# Patient Record
Sex: MALE | NOT HISPANIC OR LATINO | Employment: UNEMPLOYED | URBAN - NONMETROPOLITAN AREA
[De-identification: names, ages, dates, MRNs, and addresses within clinical notes are randomized per-mention and may not be internally consistent; named-entity substitution may affect disease eponyms.]

---

## 2021-03-05 RX ORDER — ERGOCALCIFEROL 1.25 MG/1
50000 CAPSULE ORAL WEEKLY
COMMUNITY

## 2021-03-05 RX ORDER — INSULIN GLARGINE 100 [IU]/ML
INJECTION, SOLUTION SUBCUTANEOUS
COMMUNITY
End: 2021-04-23 | Stop reason: HOSPADM

## 2021-03-05 RX ORDER — FLUTICASONE PROPIONATE 50 MCG
2 SPRAY, SUSPENSION (ML) NASAL DAILY
COMMUNITY
End: 2021-03-16

## 2021-03-05 RX ORDER — PRAVASTATIN SODIUM 10 MG
10 TABLET ORAL DAILY
COMMUNITY
End: 2021-03-16

## 2021-03-16 ENCOUNTER — OFFICE VISIT (OUTPATIENT)
Dept: GASTROENTEROLOGY | Facility: CLINIC | Age: 56
End: 2021-03-16

## 2021-03-16 VITALS
SYSTOLIC BLOOD PRESSURE: 160 MMHG | DIASTOLIC BLOOD PRESSURE: 80 MMHG | HEIGHT: 64 IN | OXYGEN SATURATION: 98 % | BODY MASS INDEX: 34.45 KG/M2 | TEMPERATURE: 98.4 F | HEART RATE: 69 BPM | WEIGHT: 201.8 LBS

## 2021-03-16 DIAGNOSIS — R19.4 CHANGE IN BOWEL HABITS: Primary | ICD-10-CM

## 2021-03-16 DIAGNOSIS — Z12.11 ENCOUNTER FOR SCREENING FOR MALIGNANT NEOPLASM OF COLON: ICD-10-CM

## 2021-03-16 DIAGNOSIS — Z80.0 FAMILY HX OF COLON CANCER: ICD-10-CM

## 2021-03-16 DIAGNOSIS — K52.9 CHRONIC DIARRHEA: ICD-10-CM

## 2021-03-16 DIAGNOSIS — R68.81 EARLY SATIETY: ICD-10-CM

## 2021-03-16 DIAGNOSIS — Z01.812 PRE-PROCEDURE LAB EXAM: Primary | ICD-10-CM

## 2021-03-16 PROCEDURE — 99204 OFFICE O/P NEW MOD 45 MIN: CPT | Performed by: INTERNAL MEDICINE

## 2021-03-16 RX ORDER — SODIUM, POTASSIUM,MAG SULFATES 17.5-3.13G
2 SOLUTION, RECONSTITUTED, ORAL ORAL ONCE
Qty: 354 ML | Refills: 0 | Status: SHIPPED | OUTPATIENT
Start: 2021-03-16 | End: 2021-03-16

## 2021-03-16 RX ORDER — NAPROXEN SODIUM 220 MG
TABLET ORAL AS NEEDED
COMMUNITY

## 2021-03-16 RX ORDER — SODIUM CHLORIDE 9 MG/ML
70 INJECTION, SOLUTION INTRAVENOUS CONTINUOUS PRN
Status: CANCELLED | OUTPATIENT
Start: 2021-03-16

## 2021-03-16 NOTE — PROGRESS NOTES
New Patient Consult      Date: 2021   Patient Name: Robinson Tovar  MRN: [id number]  : 1965     Referring Physician: Eleonora Garcia APRN    Chief Complaint   Patient presents with   • Early Satiety       History of Present Illness: Robinson Tovar is a 55 y.o. male who is here today to establish care with Gastroenterology for evaluation for change in bowel habit and early satiety, diarrhea.   He has a chronic episodic diarrhea for about 15-20 yrs. He normally gets one soft bowel movement but intermittently gets episodes of diarhea. He gets these episodes once in a week or so, last for 3-4 days and clears off. Associated abdominal rumbling. He feels like full all day without any hunger. He will get lot of gas and benching.     This patient deny any abdominal pain, no recent change in bowel habit, hematochezia or melena.  Weight is stable. Pt denies nausea, vomiting or odynophagia or dysphagia. There is  history of occasional acid reflux. There is no history of anemia. No prior recent history of EGD or colonoscopy. Family history of colon cancer- Dad at the age of 70. No history of any abdominal surgery. Denies alcohol abuse or cigarette smoking. He quit chewing tobacco.     He has a diabetes mellitus with noncompliance with medication.  He is currently on insulin.     Subjective      Past Medical History:   Past Medical History:   Diagnosis Date   • Diabetes mellitus (CMS/HCC)    • Early satiety    • Gastroparesis    • Hyperlipidemia    • Hypertension        Past Surgical History:   Past Surgical History:   Procedure Laterality Date   • EYE SURGERY  2017   • KIDNEY STONE SURGERY         Family History:   Family History   Problem Relation Age of Onset   • Diabetes Father        Social History:   Social History     Socioeconomic History   • Marital status: Unknown     Spouse name: Not on file   • Number of children: Not on file   • Years of education: Not on file   • Highest education level: Not  on file         Current Outpatient Medications:   •  ergocalciferol (Drisdol) 1.25 MG (31768 UT) capsule, Take 50,000 Units by mouth 1 (One) Time Per Week., Disp: , Rfl:   •  insulin aspart (novoLOG FLEXPEN) 100 UNIT/ML solution pen-injector sc pen, Inject  under the skin into the appropriate area as directed 3 (Three) Times a Day With Meals., Disp: , Rfl:   •  Insulin Glargine (BASAGLAR KWIKPEN) 100 UNIT/ML injection pen, Inject  under the skin into the appropriate area as directed., Disp: , Rfl:   •  Pseudoephedrine-Naproxen Na ER (Aleve-D Sinus & Cold) 120-220 MG tablet sustained-release 12 hour, Take  by mouth., Disp: , Rfl:   •  sodium-potassium-magnesium sulfates (Suprep Bowel Prep Kit) 17.5-3.13-1.6 GM/177ML solution oral solution, Take 2 bottles by mouth 1 (One) Time for 1 dose. Please see prep instructions from office., Disp: 354 mL, Rfl: 0    Allergies   Allergen Reactions   • Apidra [Insulin Glulisine] Nausea And Vomiting   • Basaglar Kwikpen [Insulin Glargine] Nausea And Vomiting     Nausea, vomiting, diarrhea   • Humalog [Insulin Lispro] GI Intolerance   • Levemir [Insulin Detemir] Other (See Comments)     Abdominal pain   • Novolog [Insulin Aspart] Nausea And Vomiting     Nausea, vomiting, stomach cramps   • Tresiba [Insulin Degludec] Diarrhea     Diarrhea and stomach cramps   • Xultophy [Insulin Degludec-Liraglutide] Diarrhea     Diarrhea and stomach cramps       Review of Systems:   Review of Systems   Constitutional: Negative for appetite change, fatigue, fever and unexpected weight loss.   HENT: Negative for trouble swallowing.    Respiratory: Negative for cough, shortness of breath and wheezing.    Cardiovascular: Negative for chest pain, palpitations and leg swelling.   Gastrointestinal: Positive for diarrhea and GERD. Negative for abdominal distention, abdominal pain, anal bleeding, blood in stool, constipation, nausea, rectal pain, vomiting and indigestion.   Genitourinary: Negative for  "dysuria, frequency and hematuria.   Musculoskeletal: Negative for back pain and joint swelling.   Skin: Negative for color change, rash and skin lesions.   Neurological: Negative for dizziness, syncope, speech difficulty, weakness, headache and memory problem.   Hematological: Negative for adenopathy. Does not bruise/bleed easily.   Psychiatric/Behavioral: Negative for agitation, behavioral problems, suicidal ideas and depressed mood.       The following portions of the patient's history were reviewed and updated as appropriate: allergies, current medications, past family history, past medical history, past social history, past surgical history and problem list.    Objective     Physical Exam:  Vital Signs:   Vitals:    03/16/21 1542   BP: 160/80   Pulse: 69   Temp: 98.4 °F (36.9 °C)   TempSrc: Temporal   SpO2: 98%   Weight: 91.5 kg (201 lb 12.8 oz)   Height: 162.6 cm (64\")       Physical Exam  Vitals and nursing note reviewed.   Constitutional:       Appearance: He is well-developed. He is obese.   HENT:      Head: Normocephalic and atraumatic.      Right Ear: External ear normal.      Left Ear: External ear normal.   Eyes:      Conjunctiva/sclera: Conjunctivae normal.   Neck:      Thyroid: No thyromegaly.      Trachea: No tracheal deviation.   Cardiovascular:      Rate and Rhythm: Normal rate and regular rhythm.      Heart sounds: No murmur.   Pulmonary:      Effort: Pulmonary effort is normal. No respiratory distress.      Breath sounds: Normal breath sounds.   Abdominal:      General: Bowel sounds are normal. There is no distension.      Palpations: Abdomen is soft. There is no mass.      Tenderness: There is no abdominal tenderness.      Hernia: No hernia is present.   Musculoskeletal:         General: Normal range of motion.      Cervical back: Normal range of motion and neck supple.   Skin:     General: Skin is warm and dry.   Neurological:      General: No focal deficit present.      Mental Status: He is " alert and oriented to person, place, and time.      Cranial Nerves: No cranial nerve deficit.      Sensory: No sensory deficit.   Psychiatric:         Mood and Affect: Mood normal.         Behavior: Behavior normal.         Thought Content: Thought content normal.         Judgment: Judgment normal.         Results Review:   I have reviewed the patient's new clinical and imaging results and agree with the interpretation.     No results found for any previous visit.      No radiology results for the last 90 days.    Assessment / Plan      Assessment & Plan:  1. Change in bowel habits  2. Chronic diarrhea  He has a chronic episodic intermittent diarrhea with abdominal bloating.  This is most likely associated with autonomic neuropathy associated with the diabetes mellitus and likely irritable bowel.  He has the symptoms for over 10 to 15 years since he was diagnosed with diabetes.   No alarming symptoms.  Currently he is having a soft daily bowel movement.   Advised half a pill of Imodium once daily or twice daily during diarrheal episode if bowel movements more than 3/day    - Case Request; Standing  - Implement Anesthesia Orders Day of Procedure; Standing  - Obtain Informed Consent; Standing  - Verify Bowel Prep Was Successful; Standing  - Oxygen Therapy- Nasal Cannula; 2 LPM; Titrate for SPO2: equal to or greater than, 90%; Standing  - POC Glucose Once; Standing  - sodium chloride 0.9 % infusion  - Case Request    3. Early satiety  Patient's history is more suggestive of some degree of gastroparesis associated with her diabetes  He denies any significant nausea or vomiting at this time  Low-fat low fiber small meals advised  We will schedule him for an EGD for further evaluation      4. Family hx of colon cancer  5. Encounter for screening for malignant neoplasm of colon  His father had a colon cancer at age of 70s.  He is due for screening colonoscopy now will schedule the same    The indications, technique,  alternatives and potential risk and complications were discussed with the patient including but not limited to bleeding, bowel perforations, missing lesions and anesthetic complications. The patient understands and wishes to proceed with the procedure and has given their verbal consent. Written patient education information was given to the patient.   The patient will call if they have further questions before procedure.     - Case Request; Standing  - Implement Anesthesia Orders Day of Procedure; Standing  - Obtain Informed Consent; Standing  - Verify Bowel Prep Was Successful; Standing  - Oxygen Therapy- Nasal Cannula; 2 LPM; Titrate for SPO2: equal to or greater than, 90%; Standing  - POC Glucose Once; Standing  - sodium chloride 0.9 % infusion  - Case Request        Follow Up:   Return for Follow Up after procedure.    Cheko Rogel MD  [facility] [place]  3/16/2021  16:57 EDT    Please note that portions of this note may have been completed with a voice recognition program.

## 2021-03-18 PROBLEM — R68.81 EARLY SATIETY: Status: ACTIVE | Noted: 2021-03-18

## 2021-03-18 PROBLEM — R19.4 CHANGE IN BOWEL HABITS: Status: ACTIVE | Noted: 2021-03-18

## 2021-03-18 PROBLEM — K52.9 CHRONIC DIARRHEA: Status: ACTIVE | Noted: 2021-03-18

## 2021-03-18 PROBLEM — Z12.11 ENCOUNTER FOR SCREENING FOR MALIGNANT NEOPLASM OF COLON: Status: ACTIVE | Noted: 2021-03-18

## 2021-04-20 ENCOUNTER — LAB (OUTPATIENT)
Dept: LAB | Facility: HOSPITAL | Age: 56
End: 2021-04-20

## 2021-04-20 PROCEDURE — U0004 COV-19 TEST NON-CDC HGH THRU: HCPCS | Performed by: PHYSICIAN ASSISTANT

## 2021-04-23 ENCOUNTER — ANESTHESIA EVENT (OUTPATIENT)
Dept: GASTROENTEROLOGY | Facility: HOSPITAL | Age: 56
End: 2021-04-23

## 2021-04-23 ENCOUNTER — HOSPITAL ENCOUNTER (OUTPATIENT)
Facility: HOSPITAL | Age: 56
Setting detail: HOSPITAL OUTPATIENT SURGERY
Discharge: HOME | End: 2021-04-23
Attending: INTERNAL MEDICINE | Admitting: INTERNAL MEDICINE

## 2021-04-23 ENCOUNTER — ANESTHESIA (OUTPATIENT)
Dept: GASTROENTEROLOGY | Facility: HOSPITAL | Age: 56
End: 2021-04-23

## 2021-04-23 VITALS
DIASTOLIC BLOOD PRESSURE: 86 MMHG | WEIGHT: 201 LBS | HEART RATE: 72 BPM | SYSTOLIC BLOOD PRESSURE: 142 MMHG | BODY MASS INDEX: 34.31 KG/M2 | TEMPERATURE: 97.8 F | HEIGHT: 64 IN | RESPIRATION RATE: 16 BRPM | OXYGEN SATURATION: 99 %

## 2021-04-23 DIAGNOSIS — R68.81 EARLY SATIETY: ICD-10-CM

## 2021-04-23 DIAGNOSIS — Z12.11 ENCOUNTER FOR SCREENING FOR MALIGNANT NEOPLASM OF COLON: ICD-10-CM

## 2021-04-23 DIAGNOSIS — K52.9 CHRONIC DIARRHEA: ICD-10-CM

## 2021-04-23 DIAGNOSIS — R19.4 CHANGE IN BOWEL HABITS: ICD-10-CM

## 2021-04-23 LAB — GLUCOSE BLDC GLUCOMTR-MCNC: 133 MG/DL (ref 70–130)

## 2021-04-23 PROCEDURE — 45390 COLONOSCOPY W/RESECTION: CPT | Performed by: INTERNAL MEDICINE

## 2021-04-23 PROCEDURE — C1889 IMPLANT/INSERT DEVICE, NOC: HCPCS | Performed by: INTERNAL MEDICINE

## 2021-04-23 PROCEDURE — 45380 COLONOSCOPY AND BIOPSY: CPT | Performed by: INTERNAL MEDICINE

## 2021-04-23 PROCEDURE — 25010000002 PROPOFOL 1000 MG/100ML EMULSION: Performed by: NURSE ANESTHETIST, CERTIFIED REGISTERED

## 2021-04-23 PROCEDURE — 82962 GLUCOSE BLOOD TEST: CPT

## 2021-04-23 PROCEDURE — 45385 COLONOSCOPY W/LESION REMOVAL: CPT | Performed by: INTERNAL MEDICINE

## 2021-04-23 PROCEDURE — 43239 EGD BIOPSY SINGLE/MULTIPLE: CPT | Performed by: INTERNAL MEDICINE

## 2021-04-23 DEVICE — DEV CLIP ENDO RESOLUTION360 CONTRL ROT 235CM: Type: IMPLANTABLE DEVICE | Site: RECTUM | Status: FUNCTIONAL

## 2021-04-23 DEVICE — CLIPPING DEVICE
Type: IMPLANTABLE DEVICE | Site: RECTUM | Status: FUNCTIONAL
Brand: RESOLUTION CLIP

## 2021-04-23 RX ORDER — LIDOCAINE HYDROCHLORIDE 20 MG/ML
INJECTION, SOLUTION INTRAVENOUS AS NEEDED
Status: DISCONTINUED | OUTPATIENT
Start: 2021-04-23 | End: 2021-04-23 | Stop reason: SURG

## 2021-04-23 RX ORDER — SODIUM CHLORIDE 9 MG/ML
70 INJECTION, SOLUTION INTRAVENOUS CONTINUOUS PRN
Status: DISCONTINUED | OUTPATIENT
Start: 2021-04-23 | End: 2021-04-23 | Stop reason: HOSPADM

## 2021-04-23 RX ORDER — PROPOFOL 10 MG/ML
INJECTION, EMULSION INTRAVENOUS AS NEEDED
Status: DISCONTINUED | OUTPATIENT
Start: 2021-04-23 | End: 2021-04-23 | Stop reason: SURG

## 2021-04-23 RX ORDER — SODIUM CHLORIDE 0.9 % (FLUSH) 0.9 %
10 SYRINGE (ML) INJECTION AS NEEDED
Status: DISCONTINUED | OUTPATIENT
Start: 2021-04-23 | End: 2021-04-23 | Stop reason: HOSPADM

## 2021-04-23 RX ADMIN — PROPOFOL 50 MG: 10 INJECTION, EMULSION INTRAVENOUS at 08:25

## 2021-04-23 RX ADMIN — PROPOFOL 100 MG: 10 INJECTION, EMULSION INTRAVENOUS at 08:48

## 2021-04-23 RX ADMIN — LIDOCAINE HYDROCHLORIDE 60 MG: 20 INJECTION, SOLUTION INTRAVENOUS at 08:20

## 2021-04-23 RX ADMIN — SODIUM CHLORIDE 70 ML/HR: 9 INJECTION, SOLUTION INTRAVENOUS at 07:34

## 2021-04-23 RX ADMIN — PROPOFOL 50 MG: 10 INJECTION, EMULSION INTRAVENOUS at 08:20

## 2021-04-23 NOTE — ANESTHESIA PREPROCEDURE EVALUATION
Anesthesia Evaluation     Patient summary reviewed and Nursing notes reviewed   NPO Solid Status: > 8 hours  NPO Liquid Status: > 8 hours           Airway   Mallampati: II  TM distance: >3 FB  Neck ROM: full  No difficulty expected  Dental - normal exam     Pulmonary - normal exam   Cardiovascular - normal exam    (+) hypertension well controlled less than 2 medications, valvular problems/murmurs murmur, hyperlipidemia,       Neuro/Psych  GI/Hepatic/Renal/Endo    (+)  GERD well controlled,  diabetes mellitus type 1 well controlled using insulin,     Musculoskeletal     Abdominal  - normal exam   Substance History      OB/GYN          Other                        Anesthesia Plan    ASA 2     MAC     intravenous induction     Anesthetic plan, all risks, benefits, and alternatives have been provided, discussed and informed consent has been obtained with: patient.    Plan discussed with CRNA.

## 2021-04-23 NOTE — ANESTHESIA POSTPROCEDURE EVALUATION
Patient: Robinson Tovar    Procedure Summary     Date: 04/23/21 Room / Location: [facility] 1 / [facility]    Anesthesia Start: 0819 Anesthesia Stop: 0906    Procedures:       ESOPHAGOGASTRODUODENOSCOPY WITH BIOPSIES (N/A Esophagus)      COLONOSCOPY WITH BIOPSIES (N/A Anus) Diagnosis:       Change in bowel habits      Early satiety      Encounter for screening for malignant neoplasm of colon      Chronic diarrhea      (Change in bowel habits [R19.4])      (Early satiety [R68.81])      (Encounter for screening for malignant neoplasm of colon [Z12.11])      (Chronic diarrhea [K52.9])    Surgeons: Cheko Rogel MD Provider: Alireza Finch CRNA    Anesthesia Type: MAC ASA Status: 2          Anesthesia Type: MAC    Vitals  Vitals Value Taken Time   /86 04/23/21 0942   Temp 97.8 °F (36.6 °C) 04/23/21 0912   Pulse 72 04/23/21 0942   Resp 16 04/23/21 0942   SpO2 99 % 04/23/21 0942           Post Anesthesia Care and Evaluation    Patient location during evaluation: bedside  Patient participation: complete - patient participated  Level of consciousness: awake  Pain score: 0  Pain management: adequate  Airway patency: patent  Anesthetic complications: No anesthetic complications  PONV Status: controlled  Cardiovascular status: acceptable and stable  Respiratory status: acceptable and room air  Hydration status: acceptable

## 2021-04-27 ENCOUNTER — TELEPHONE (OUTPATIENT)
Dept: GASTROENTEROLOGY | Facility: CLINIC | Age: 56
End: 2021-04-27

## 2021-04-27 LAB
LAB AP CASE REPORT: NORMAL
PATH REPORT.FINAL DX SPEC: NORMAL

## 2021-04-27 RX ORDER — OMEPRAZOLE 20 MG/1
20 CAPSULE, DELAYED RELEASE ORAL
Qty: 30 CAPSULE | Refills: 2 | Status: SHIPPED | OUTPATIENT
Start: 2021-04-27 | End: 2021-04-28 | Stop reason: SDUPTHER

## 2021-04-27 NOTE — TELEPHONE ENCOUNTER
Patient called for biopsy results.  He has a couple precancerous polyps, which are out, and possible inflammation in the stomach, results will be discussed in detail at the [facility] appt in June.

## 2021-04-28 ENCOUNTER — TELEPHONE (OUTPATIENT)
Dept: GASTROENTEROLOGY | Facility: CLINIC | Age: 56
End: 2021-04-28

## 2021-04-28 RX ORDER — OMEPRAZOLE 20 MG/1
20 CAPSULE, DELAYED RELEASE ORAL
Qty: 30 CAPSULE | Refills: 2 | Status: SHIPPED | OUTPATIENT
Start: 2021-04-28 | End: 2021-08-24

## 2021-04-28 NOTE — TELEPHONE ENCOUNTER
----- Message from Rebecca Dimas PA-C sent at 4/28/2021  2:39 PM EDT -----  Re-sent to [facility] in [facility]  ----- Message -----  From: Jenniffer Adan MA  Sent: 4/28/2021   1:48 PM EDT  To: Rebecca Dimas PA-C    Patient states pharmacy did not get his prescription.  I called the pharmacy, no one answered.  Can you resend it please?

## 2021-06-03 ENCOUNTER — OFFICE VISIT (OUTPATIENT)
Dept: GASTROENTEROLOGY | Facility: CLINIC | Age: 56
End: 2021-06-03

## 2021-06-03 VITALS
TEMPERATURE: 97.7 F | WEIGHT: 199 LBS | RESPIRATION RATE: 24 BRPM | DIASTOLIC BLOOD PRESSURE: 100 MMHG | BODY MASS INDEX: 33.97 KG/M2 | HEART RATE: 78 BPM | SYSTOLIC BLOOD PRESSURE: 185 MMHG | HEIGHT: 64 IN

## 2021-06-03 DIAGNOSIS — K21.9 GASTROESOPHAGEAL REFLUX DISEASE WITHOUT ESOPHAGITIS: ICD-10-CM

## 2021-06-03 DIAGNOSIS — D12.8 TUBULAR ADENOMA OF RECTUM: ICD-10-CM

## 2021-06-03 DIAGNOSIS — R68.81 EARLY SATIETY: Primary | ICD-10-CM

## 2021-06-03 DIAGNOSIS — K58.0 IRRITABLE BOWEL SYNDROME WITH DIARRHEA: ICD-10-CM

## 2021-06-03 PROCEDURE — 99214 OFFICE O/P EST MOD 30 MIN: CPT | Performed by: PHYSICIAN ASSISTANT

## 2021-06-03 RX ORDER — DEXLANSOPRAZOLE 60 MG/1
60 CAPSULE, DELAYED RELEASE ORAL DAILY
Qty: 15 CAPSULE | Refills: 0 | COMMUNITY
Start: 2021-06-03 | End: 2021-08-24

## 2021-06-03 NOTE — PROGRESS NOTES
Follow Up Note     Date: 2021   Patient Name: Robinson Tovar  MRN: [id number]  : 1965     Primary Care Provider: Eleonora Garcia APRN     Chief Complaint:    Chief Complaint   Patient presents with   • Follow-up   • Change in bowel habits   • Early satiety     History of present illness:   2021  Robinson Tovar is a 56 y.o. male who is here today for follow up regarding recent colonoscopy and change in bowel habits.    He would like to discuss results of his recent colonoscopy.  He is still intermittent but mild diarrhea.  He is working on changing his diet which has helped some with symptoms.  He has stopped taking Prilosec 20 mg once daily and this is helping some he has early satiety, nausea and belching and bloating.  He still has the symptoms intermittently but has noticed improvement.  He does drink soda daily.    Interval History:  3/16/2021  He has a chronic episodic diarrhea for about 15-20 yrs. He normally gets one soft bowel movement but intermittently gets episodes of diarhea. He gets these episodes once in a week or so, last for 3-4 days and clears off. Associated abdominal rumbling. He feels like full all day without any hunger. He will get lot of gas and benching.      This patient deny any abdominal pain, no recent change in bowel habit, hematochezia or melena.  Weight is stable. Pt denies nausea, vomiting or odynophagia or dysphagia. There is  history of occasional acid reflux. There is no history of anemia. No prior recent history of EGD or colonoscopy. Family history of colon cancer- Dad at the age of 70. No history of any abdominal surgery. Denies alcohol abuse or cigarette smoking. He quit chewing tobacco.      He has a diabetes mellitus with noncompliance with medication.  He is currently on insulin.        Subjective     Past Medical History:   Diagnosis Date   • Diabetes mellitus (CMS/HCC)    • Disease of thyroid gland    • Early satiety    • Elevated cholesterol     • Gastroparesis    • GERD (gastroesophageal reflux disease)    • Heart murmur     as a child   • Hyperlipidemia    • Hypertension      Past Surgical History:   Procedure Laterality Date   • COLONOSCOPY     • COLONOSCOPY N/A 4/23/2021    Procedure: COLONOSCOPY WITH BIOPSIES, AND RESOLUTION CLIPS;  Surgeon: Cheko Rogel MD;  Location: [facility] ENDOSCOPY;  Service: Gastroenterology;  Laterality: N/A;   • ENDOSCOPY N/A 4/23/2021    Procedure: ESOPHAGOGASTRODUODENOSCOPY WITH BIOPSIES;  Surgeon: Cheko Rogel MD;  Location: [facility] ENDOSCOPY;  Service: Gastroenterology;  Laterality: N/A;   • EYE SURGERY Right 2017    artificial lens placed   • KIDNEY STONE SURGERY       Family History   Problem Relation Age of Onset   • Diabetes Father    • Colon cancer Father    • Cirrhosis Neg Hx    • Liver disease Neg Hx    • Liver cancer Neg Hx      Social History     Socioeconomic History   • Marital status: Unknown     Spouse name: Not on file   • Number of children: Not on file   • Years of education: Not on file   • Highest education level: Not on file   Tobacco Use   • Smoking status: Never Smoker   • Smokeless tobacco: Former User     Types: Chew   Vaping Use   • Vaping Use: Never used   Substance and Sexual Activity   • Alcohol use: Not Currently   • Drug use: Never   • Sexual activity: Defer       Current Outpatient Medications:   •  ergocalciferol (Drisdol) 1.25 MG (50445 UT) capsule, Take 50,000 Units by mouth 1 (One) Time Per Week., Disp: , Rfl:   •  insulin aspart (novoLOG FLEXPEN) 100 UNIT/ML solution pen-injector sc pen, Inject  under the skin into the appropriate area as directed 3 (Three) Times a Day With Meals. 10-15 units sc with Meals (depends on blood sugar per pt report).   Per pharmacy @ [facility]:  Max dose 70 units TID with meals., Disp: , Rfl:   •  insulin detemir (LEVEMIR) 100 UNIT/ML injection, Inject 80 Units under the skin into the appropriate area as directed Every Night.,  Disp: , Rfl:   •  omeprazole (priLOSEC) 20 MG capsule, Take 1 capsule by mouth Every Morning Before Breakfast., Disp: 30 capsule, Rfl: 2  •  Pseudoephedrine-Naproxen Na ER (Aleve-D Sinus & Cold) 120-220 MG tablet sustained-release 12 hour, Take  by mouth., Disp: , Rfl:     Allergies   Allergen Reactions   • Sulfa Antibiotics Anaphylaxis   • Apidra [Insulin Glulisine] Nausea And Vomiting   • Basaglar Kwikpen [Insulin Glargine] Nausea And Vomiting     Nausea, vomiting, diarrhea   • Humalog [Insulin Lispro] GI Intolerance   • Tresiba [Insulin Degludec] Diarrhea     Diarrhea and stomach cramps   • Xultophy [Insulin Degludec-Liraglutide] Diarrhea     Diarrhea and stomach cramps   • Penicillins Other (See Comments)     Pt unsure of reaction         The following portions of the patient's history were reviewed and updated as appropriate: allergies, current medications, past family history, past medical history, past social history, past surgical history and problem list.  Objective     Physical Exam  Constitutional:       General: He is not in acute distress.     Appearance: Normal appearance. He is well-developed. He is not diaphoretic.   HENT:      Head: Normocephalic and atraumatic.      Right Ear: External ear normal.      Left Ear: External ear normal.      Nose: Nose normal.      Mouth/Throat:      Comments: Wearing a mask  Eyes:      General: No scleral icterus.        Right eye: No discharge.         Left eye: No discharge.      Conjunctiva/sclera: Conjunctivae normal.   Neck:      Trachea: No tracheal deviation.   Pulmonary:      Effort: Pulmonary effort is normal. No respiratory distress.   Musculoskeletal:         General: Normal range of motion.      Cervical back: Normal range of motion.   Skin:     Coloration: Skin is not pale.      Findings: No erythema or rash.   Neurological:      Mental Status: He is alert and oriented to person, place, and time.      Coordination: Coordination normal.   Psychiatric:     "     Mood and Affect: Mood normal.         Behavior: Behavior normal.         Thought Content: Thought content normal.         Judgment: Judgment normal.       Vitals:    06/03/21 1453 06/03/21 1456   BP: (!) 183/110  Comment: Rt arm (!) 185/100  Comment: Lt arm   Pulse: 82 78   Resp: 24    Temp: 97.7 °F (36.5 °C)    Weight: 90.3 kg (199 lb)    Height: 162.6 cm (64\")        Results Review:   I reviewed the patient's new clinical results.     No radiology results for the last 90 days.     EGD and colonoscopy were completed by Dr. Rogel on 4/23/2021:  - The oropharynx was normal.  - The Z-line was regular and was found 36 cm from the incisors.  - No gross lesions were noted in the entire esophagus.  - Patchy mildly erythematous mucosa without bleeding was found on the posterior wall of the stomach and in the gastric  antrum. Biopsies were taken with a cold forceps for Helicobacter pylori testing. Biopsies were taken with a cold forceps  for Helicobacter pylori testing.  - The duodenal bulb, first portion of the duodenum, second portion of the duodenum and third portion of the duodenum  were normal. Biopsies for histology were taken with a cold forceps for evaluation of celiac disease.  - The perianal and digital rectal examinations were normal.  - A 5 mm polyp was found in the proximal descending colon. The polyp was sessile. The polyp was removed with a cold  snare. Resection and retrieval were complete.  - A 15 to 16 mm polyp was found in the rectum. The polyp was flat and Lyla classification IIb (flat). Preparations were  made for mucosal resection. 5 mL of orise was injected with adequate lift of the lesion from the muscularis propria.  Snare mucosal resection with suction (via the working channel) retrieval was performed. A 15 x 20 mm area was  resected. Resection and retrieval were complete. There was no bleeding during the procedure. To close a defect after  polypectomy, three hemostatic clips were " successfully placed (MR conditional). There was no bleeding at the end of the  procedure. Area was tattooed with an injection of 4 mL of Tanya ink.  - A few small-mouthed diverticula were found in the sigmoid colon and ascending colon.  - Biopsies for histology were taken with a cold forceps from the right colon, left colon and transverse colon for evaluation  of microscopic colitis.  - The terminal ileum appeared normal. Biopsies were taken with a cold forceps for histology for diarrhea.    Pathology showed unremarkable duodenal mucosa with preserved villous architecture, reactive gastropathy, negative H. pylori, negative for intestinal metaplasia of the antral biopsy, random colon biopsies were unremarkable, descending colon polyp was tubular adenoma negative for high-grade dysplasia, rectal polyp was tubular adenoma.    Assessment / Plan      1. Early satiety  6/3/2021  EGD did not show any signs of gastroparesis.  His upper GI symptoms are likely related to diabetes mellitus.  I have recommended that he discontinue all soda intake.  He should not drink any carbonated beverages which may increase his bloating, belching and early satiety.  Patient have low fiber, low fat diet with small portion sizes.    3/16/2021  Patient's history is more suggestive of some degree of gastroparesis associated with her diabetes  He denies any significant nausea or vomiting at this time  Low-fat low fiber small meals advised  We will schedule him for an EGD for further evaluation    2. Gastroesophageal reflux disease without esophagitis  6/3/2021  He does report some improvement in his GI complaints while taking omeprazole 20 mg once daily.  I recommended that he take Dexilant 60 mg once daily samples, given today.  When he runs of the samples, he will resume taking omeprazole 20 mg once daily.  EGD did not show any signs of esophagitis, Hair's esophagus or other upper GI pathology.  - dexlansoprazole (Dexilant) 60 MG capsule;  Take 1 capsule by mouth Daily.  Dispense: 15 capsule; Refill: 0    3. Irritable bowel syndrome with diarrhea  6/3/2021  He has seen some improvement in his diarrhea recently.  Patient continue with increased water intake.  Based on review of recent EGD, colonoscopy and pathology, he has irritable bowel syndrome with diarrhea.  He can continue taking Imodium as needed for relief of diarrhea.    3/16/2021  He has a chronic episodic intermittent diarrhea with abdominal bloating.  This is most likely associated with autonomic neuropathy associated with the diabetes mellitus and likely irritable bowel.  He has the symptoms for over 10 to 15 years since he was diagnosed with diabetes.   No alarming symptoms.  Currently he is having a soft daily bowel movement.   Advised half a pill of Imodium once daily or twice daily during diarrheal episode if bowel movements more than 3/day    4. Tubular adenoma of rectum  6/3/2021  Recent colonoscopy revealed a 15 to 16 mm rectal polyp which was tubular adenomatous on pathology.  He will need repeat colonoscopy in 6 months to verify complete removal of this polyp.  He had tattooing during the procedure to flower the location.  He agrees to schedule repeat colonoscopy in October 2021.  We will wait until closer to that date to place case request for same bowel preparation.    3/16/2021  His father had a colon cancer at age of 70s.  He is due for screening colonoscopy now will schedule the same          Follow Up:   Return for follow up after procedure to discuss results.      Rebecca Dimas PA-C  [facility] [place]  6/4/2021  13:37 EDT    Please note that portions of this note may have been completed with a voice recognition program. Efforts were made to edit the dictations, but occasionally words are mistranscribed.

## 2021-06-03 NOTE — PATIENT INSTRUCTIONS
Low-FODMAP Eating Plan    FODMAPs (fermentable oligosaccharides, disaccharides, monosaccharides, and polyols) are sugars that are hard for some people to digest. A low-FODMAP eating plan may help some people who have bowel (intestinal) diseases to manage their symptoms.  This meal plan can be complicated to follow. Work with a diet and nutrition specialist (dietitian) to make a low-FODMAP eating plan that is right for you. A dietitian can make sure that you get enough nutrition from this diet.  What are tips for following this plan?  Reading food labels  · Check labels for hidden FODMAPs such as:  ? High-fructose syrup.  ? Honey.  ? Agave.  ? Natural fruit flavors.  ? Onion or garlic powder.  · Choose low-FODMAP foods that contain 3-4 grams of fiber per serving.  · Check food labels for serving sizes. Eat only one serving at a time to make sure FODMAP levels stay low.  Meal planning  · Follow a low-FODMAP eating plan for up to 6 weeks, or as told by your health care provider or dietitian.  · To follow the eating plan:  1. Eliminate high-FODMAP foods from your diet completely.  2. Gradually reintroduce high-FODMAP foods into your diet one at a time. Most people should wait a few days after introducing one high-FODMAP food before they introduce the next high-FODMAP food. Your dietitian can recommend how quickly you may reintroduce foods.  3. Keep a daily record of what you eat and drink, and make note of any symptoms that you have after eating.  4. Review your daily record with a dietitian regularly. Your dietitian can help you identify which foods you can eat and which foods you should avoid.  General tips  · Drink enough fluid each day to keep your urine pale yellow.  · Avoid processed foods. These often have added sugar and may be high in FODMAPs.  · Avoid most dairy products, whole grains, and sweeteners.  · Work with a dietitian to make sure you get enough fiber in your diet.  Recommended  "foods  Grains  · Gluten-free grains, such as rice, oats, buckwheat, quinoa, corn, polenta, and millet. Gluten-free pasta, bread, or cereal. Rice noodles. Corn tortillas.  Vegetables  · Eggplant, zucchini, cucumber, peppers, green beans, [place] sprouts, bean sprouts, lettuce, arugula, kale, Swiss chard, spinach, mahesh greens, bok estephania, summer squash, potato, and tomato. Limited amounts of corn, carrot, and sweet potato. Green parts of scallions.  Fruits  · Bananas, oranges, kelly, limes, blueberries, raspberries, strawberries, grapes, cantaloupe, honeydew melon, kiwi, papaya, passion fruit, and pineapple. Limited amounts of dried cranberries, banana chips, and shredded coconut.  Dairy  · Lactose-free milk, yogurt, and kefir. Lactose-free cottage cheese and ice cream. Non-dairy milks, such as almond, coconut, hemp, and rice milk. Yogurts made of non-dairy milks. Limited amounts of goat cheese, brie, mozzarella, parmesan, swiss, and other hard cheeses.  Meats and other protein foods  · Unseasoned beef, pork, poultry, or fish. Eggs. Springer. Tofu (firm) and tempeh. Limited amounts of nuts and seeds, such as almonds, walnuts, brazil nuts, pecans, peanuts, pumpkin seeds, efren seeds, and sunflower seeds.  Fats and oils  · Butter-free spreads. Vegetable oils, such as olive, canola, and sunflower oil.  Seasoning and other foods  · Artificial sweeteners with names that do not end in \"ol\" such as aspartame, saccharine, and stevia. Maple syrup, white table sugar, raw sugar, brown sugar, and molasses. Fresh basil, coriander, parsley, rosemary, and thyme.  Beverages  · Water and mineral water. Sugar-sweetened soft drinks. Small amounts of orange juice or cranberry juice. Black and green tea. Most dry arlene. Coffee.  This may not be a complete list of low-FODMAP foods. Talk with your dietitian for more information.  Foods to avoid  Grains  · Wheat, including kamut, durum, and semolina. Barley and bulgur. Couscous. Wheat-based " cereals. Wheat noodles, bread, crackers, and pastries.  Vegetables  · Chicory root, artichoke, asparagus, cabbage, snow peas, sugar snap peas, mushrooms, and cauliflower. Onions, garlic, leeks, and the white part of scallions.  Fruits  · Fresh, dried, and juiced forms of apple, pear, watermelon, peach, plum, cherries, apricots, blackberries, boysenberries, figs, nectarines, and liliana. Avocado.  Dairy  · Milk, yogurt, ice cream, and soft cheese. Cream and sour cream. Milk-based sauces. Custard.  Meats and other protein foods  · Fried or fatty meat. Sausage. Cashews and pistachios. Soybeans, baked beans, black beans, chickpeas, kidney beans, anthony beans, navy beans, lentils, and split peas.  Seasoning and other foods  · Any sugar-free gum or candy. Foods that contain artificial sweeteners such as sorbitol, mannitol, isomalt, or xylitol. Foods that contain honey, high-fructose corn syrup, or agave. Bouillon, vegetable stock, beef stock, and chicken stock. Garlic and onion powder. Condiments made with onion, such as hummus, chutney, pickles, relish, salad dressing, and salsa. Tomato paste.  Beverages  · Chicory-based drinks. Coffee substitutes. Chamomile tea. Fennel tea. Sweet or fortified arlene such as port or vito. Diet soft drinks made with isomalt, mannitol, maltitol, sorbitol, or xylitol. Apple, pear, and liliana juice. Juices with high-fructose corn syrup.  This may not be a complete list of high-FODMAP foods. Talk with your dietitian to discuss what dietary choices are best for you.   Summary  · A low-FODMAP eating plan is a short-term diet that eliminates FODMAPs from your diet to help ease symptoms of certain bowel diseases.  · The eating plan usually lasts up to 6 weeks. After that, high-FODMAP foods are restarted gradually, one at a time, so you can find out which may be causing symptoms.  · A low-FODMAP eating plan can be complicated. It is best to work with a dietitian who has experience with this type of  plan.  This information is not intended to replace advice given to you by your health care provider. Make sure you discuss any questions you have with your health care provider.  Document Revised: 11/30/2018 Document Reviewed: 08/14/2018  Elsevier Patient Education © 2021 [employer]

## 2021-08-24 ENCOUNTER — TELEPHONE (OUTPATIENT)
Dept: GASTROENTEROLOGY | Facility: CLINIC | Age: 56
End: 2021-08-24

## 2021-08-24 DIAGNOSIS — K21.9 GASTROESOPHAGEAL REFLUX DISEASE, UNSPECIFIED WHETHER ESOPHAGITIS PRESENT: Primary | ICD-10-CM

## 2021-08-24 NOTE — TELEPHONE ENCOUNTER
Patient called, he needs refill on stomach meds.  But he says that neither Dexilant nor Omeprazole is working for him.

## 2021-08-26 NOTE — TELEPHONE ENCOUNTER
Patient called back, informed patient of meds being sent.  He will let us know if this one helps or not.

## 2022-03-21 DIAGNOSIS — K21.9 GASTROESOPHAGEAL REFLUX DISEASE, UNSPECIFIED WHETHER ESOPHAGITIS PRESENT: ICD-10-CM

## 2022-04-24 DIAGNOSIS — K21.9 GASTROESOPHAGEAL REFLUX DISEASE, UNSPECIFIED WHETHER ESOPHAGITIS PRESENT: ICD-10-CM

## 2022-06-01 ENCOUNTER — OFFICE VISIT (OUTPATIENT)
Dept: GASTROENTEROLOGY | Facility: CLINIC | Age: 57
End: 2022-06-01

## 2022-06-01 VITALS
DIASTOLIC BLOOD PRESSURE: 84 MMHG | SYSTOLIC BLOOD PRESSURE: 140 MMHG | WEIGHT: 179 LBS | HEIGHT: 64 IN | BODY MASS INDEX: 30.56 KG/M2 | TEMPERATURE: 98.1 F

## 2022-06-01 DIAGNOSIS — K58.0 IRRITABLE BOWEL SYNDROME WITH DIARRHEA: Chronic | ICD-10-CM

## 2022-06-01 DIAGNOSIS — K21.9 GASTROESOPHAGEAL REFLUX DISEASE WITHOUT ESOPHAGITIS: Chronic | ICD-10-CM

## 2022-06-01 DIAGNOSIS — Z86.010 PERSONAL HISTORY OF COLONIC POLYPS: Primary | ICD-10-CM

## 2022-06-01 DIAGNOSIS — R68.81 EARLY SATIETY: ICD-10-CM

## 2022-06-01 DIAGNOSIS — Z80.0 FAMILY HISTORY OF COLON CANCER IN FATHER: ICD-10-CM

## 2022-06-01 PROCEDURE — 99214 OFFICE O/P EST MOD 30 MIN: CPT | Performed by: NURSE PRACTITIONER

## 2022-06-01 RX ORDER — SILDENAFIL 100 MG/1
TABLET, FILM COATED ORAL
COMMUNITY
Start: 2022-02-25

## 2022-06-01 RX ORDER — SODIUM, POTASSIUM,MAG SULFATES 17.5-3.13G
SOLUTION, RECONSTITUTED, ORAL ORAL
Qty: 177 ML | Refills: 0 | Status: SHIPPED | OUTPATIENT
Start: 2022-06-01 | End: 2022-10-17 | Stop reason: HOSPADM

## 2022-06-01 RX ORDER — LOSARTAN POTASSIUM AND HYDROCHLOROTHIAZIDE 12.5; 5 MG/1; MG/1
1 TABLET ORAL DAILY
COMMUNITY
Start: 2022-03-03

## 2022-06-01 RX ORDER — SODIUM CHLORIDE 9 MG/ML
70 INJECTION, SOLUTION INTRAVENOUS CONTINUOUS PRN
Status: CANCELLED | OUTPATIENT
Start: 2022-06-01

## 2022-06-01 RX ORDER — DICYCLOMINE HCL 20 MG
20 TABLET ORAL 3 TIMES DAILY PRN
Qty: 30 TABLET | Refills: 1 | Status: SHIPPED | OUTPATIENT
Start: 2022-06-01 | End: 2023-01-23 | Stop reason: SDUPTHER

## 2022-06-01 NOTE — PROGRESS NOTES
Follow Up Note     Date: 2022   Patient Name: Robinson Tovar  MRN: [id number]  : 1965     Primary Care Provider: Eleonora Garcia APRN     Chief Complaint   Patient presents with   • Follow-up   • History of Colon Polyps     History of present illness:   2022  Robinson Tovar is a 57 y.o. male who is here today for follow up. He has been feeling better. He is taking Nexium 20 mg daily and reflux is doing better. No difficulty swallowing. No problems with early satiety at this time. Bowel habits improved, he still has occasional abdominal cramping and diarrhea, but not as frequent. 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    Interval History:  2021  Robinson Tovar is a 56 y.o. male who is here today for follow up regarding recent colonoscopy and change in bowel habits.  He would like to discuss results of his recent colonoscopy.  He is still intermittent but mild diarrhea.  He is working on changing his diet which has helped some with symptoms.  He has stopped taking Prilosec 20 mg once daily and this is helping some he has early satiety, nausea and belching and bloating.  He still has the symptoms intermittently but has noticed improvement.  He does drink soda daily.     3/16/2021  He has a chronic episodic diarrhea for about 15-20 yrs. He normally gets one soft bowel movement but intermittently gets episodes of diarhea. He gets these episodes once in a week or so, last for 3-4 days and clears off. Associated abdominal rumbling. He feels like full all day without any hunger. He will get lot of gas and benching.      This patient deny any abdominal pain, no recent change in bowel habit, hematochezia or melena.  Weight is stable. Pt denies nausea, vomiting or odynophagia or dysphagia. There is  history of occasional acid reflux. There is no history of  anemia. No prior recent history of EGD or colonoscopy. Family history of colon cancer- Dad at the age of 70. No history of any abdominal surgery. Denies alcohol abuse or cigarette smoking. He quit chewing tobacco.      He has a diabetes mellitus with noncompliance with medication.  He is currently on insulin.     Subjective      Past Medical History:   Diagnosis Date   • Diabetes mellitus (HCC)    • Disease of thyroid gland    • Early satiety    • Elevated cholesterol    • Gastroparesis    • GERD (gastroesophageal reflux disease)    • Heart murmur     as a child   • Hyperlipidemia    • Hypertension      Past Surgical History:   Procedure Laterality Date   • COLONOSCOPY     • COLONOSCOPY N/A 4/23/2021    Procedure: COLONOSCOPY WITH BIOPSIES, AND RESOLUTION CLIPS;  Surgeon: Cheko Rogel MD;  Location: [facility] ENDOSCOPY;  Service: Gastroenterology;  Laterality: N/A;   • ENDOSCOPY N/A 4/23/2021    Procedure: ESOPHAGOGASTRODUODENOSCOPY WITH BIOPSIES;  Surgeon: Cheko Rogel MD;  Location: [facility] ENDOSCOPY;  Service: Gastroenterology;  Laterality: N/A;   • EYE SURGERY Right 2017    artificial lens placed   • KIDNEY STONE SURGERY       Family History   Problem Relation Age of Onset   • Diabetes Father    • Colon cancer Father 74   • Cirrhosis Neg Hx    • Liver disease Neg Hx    • Liver cancer Neg Hx      Social History     Socioeconomic History   • Marital status: Unknown   Tobacco Use   • Smoking status: Never Smoker   • Smokeless tobacco: Former User     Types: Chew     Quit date: 4/22/2020   Vaping Use   • Vaping Use: Never used   Substance and Sexual Activity   • Alcohol use: Not Currently   • Drug use: Never   • Sexual activity: Defer       Current Outpatient Medications:   •  ergocalciferol (ERGOCALCIFEROL) 1.25 MG (34961 UT) capsule, Take 50,000 Units by mouth 1 (One) Time Per Week., Disp: , Rfl:   •  esomeprazole (nexIUM) 20 MG capsule, Take 1 capsule by mouth Every Morning Before Breakfast.,  Disp: 30 capsule, Rfl: 5  •  insulin aspart (novoLOG FLEXPEN) 100 UNIT/ML solution pen-injector sc pen, Inject  under the skin into the appropriate area as directed 3 (Three) Times a Day With Meals. 10-15 units sc with Meals (depends on blood sugar per pt report).   Per pharmacy @ [facility]:  Max dose 70 units TID with meals., Disp: , Rfl:   •  insulin detemir (LEVEMIR) 100 UNIT/ML injection, Inject 80 Units under the skin into the appropriate area as directed Every Night., Disp: , Rfl:   •  Pseudoephedrine-Naproxen Na ER (Aleve-D Sinus & Cold) 120-220 MG tablet sustained-release 12 hour, Take  by mouth As Needed., Disp: , Rfl:   •  sildenafil (VIAGRA) 100 MG tablet, TAKE 1 TABLET BY MOUTH AS DIRECTED 1 HOUR PRIOR TO SEXUAL ACTIVITY, Disp: , Rfl:   •  dicyclomine (BENTYL) 20 MG tablet, Take 1 tablet by mouth 3 (Three) Times a Day As Needed (lower abdominal pain and diarrhea)., Disp: 30 tablet, Rfl: 1  •  losartan-hydrochlorothiazide (HYZAAR) 50-12.5 MG per tablet, Take 1 tablet by mouth Daily., Disp: , Rfl:   •  sodium-potassium-magnesium sulfates (Suprep Bowel Prep Kit) 17.5-3.13-1.6 GM/177ML solution oral solution, Use as directed for colonoscopy prep. Patient has instructions., Disp: 177 mL, Rfl: 0     Allergies   Allergen Reactions   • Sulfa Antibiotics Anaphylaxis   • Apidra [Insulin Glulisine] Nausea And Vomiting   • Basaglar Kwikpen [Insulin Glargine] Nausea And Vomiting     Nausea, vomiting, diarrhea   • Humalog [Insulin Lispro] GI Intolerance   • Tresiba [Insulin Degludec] Diarrhea     Diarrhea and stomach cramps   • Xultophy [Insulin Degludec-Liraglutide] Diarrhea     Diarrhea and stomach cramps   • Penicillins Other (See Comments)     Pt unsure of reaction       The following portions of the patient's history were reviewed and updated as appropriate: allergies, current medications, past family history, past medical history, past social history, past surgical history and problem list.  Objective  "    Physical Exam  Vitals and nursing note reviewed.   Constitutional:       General: He is not in acute distress.     Appearance: Normal appearance. He is well-developed.   HENT:      Head: Normocephalic and atraumatic.      Mouth/Throat:      Mouth: Mucous membranes are not pale, not dry and not cyanotic.   Eyes:      General: Lids are normal.   Neck:      Trachea: Trachea normal.   Cardiovascular:      Rate and Rhythm: Normal rate.   Pulmonary:      Effort: Pulmonary effort is normal. No respiratory distress.      Breath sounds: Normal breath sounds.   Abdominal:      General: Bowel sounds are normal.      Palpations: Abdomen is soft. There is no mass.      Tenderness: There is no abdominal tenderness.      Hernia: No hernia is present.   Skin:     General: Skin is warm and dry.   Neurological:      Mental Status: He is alert and oriented to person, place, and time.   Psychiatric:         Mood and Affect: Mood normal.         Speech: Speech normal.         Behavior: Behavior normal. Behavior is cooperative.       Vitals:    06/01/22 1537   BP: 140/84   Temp: 98.1 °F (36.7 °C)   Weight: 81.2 kg (179 lb)   Height: 162.6 cm (64\")     Body mass index is 30.73 kg/m².     Results Review:   I reviewed the patient's new clinical results.    No visits with results within 90 Day(s) from this visit.   Latest known visit with results is:   Admission on 04/23/2021, Discharged on 04/23/2021   Component Date Value Ref Range Status   • Glucose 04/23/2021 133 (A) 70 - 130 mg/dL Final    Serial Number: [id number]:  [id number]   • Case Report 04/23/2021    Final                    Value:Surgical Pathology Report                         Case: [id number]                                  Authorizing Provider:  Cheko Rogel MD  Collected:           04/23/2021 08:31 AM          Ordering Location:     [facility]    Received:            04/23/2021 02:14 PM                                 [facility]                "                                                     Pathologist:           Omkar Grace MD                                                       Specimens:   1) - Small Intestine, Duodenum, for chronic diarrhea                                                2) - Gastric, Antrum, antrum and body for h.pylori                                                  3) - Small Intestine, Ileum, terminal ileum biopsy                                                  4) - Large Intestine, random colon biopsies from left, right and transverse colon                   5) - Large Intestine, Left / Descending Colon, descending colon polyp                                                         6) - Large Intestine, Rectum, EMR rectal polyp                                            • Final Diagnosis 04/23/2021    Final                    Value:This result contains rich text formatting which cannot be displayed here.      No radiology results for the last 90 days.     EGD and colonoscopy were completed by Dr. Rogel on 4/23/2021:  - The oropharynx was normal.  - The Z-line was regular and was found 36 cm from the incisors.  - No gross lesions were noted in the entire esophagus.  - Patchy mildly erythematous mucosa without bleeding was found on the posterior wall of the stomach and in the gastric  antrum. Biopsies were taken with a cold forceps for Helicobacter pylori testing. Biopsies were taken with a cold forceps  for Helicobacter pylori testing.  - The duodenal bulb, first portion of the duodenum, second portion of the duodenum and third portion of the duodenum  were normal. Biopsies for histology were taken with a cold forceps for evaluation of celiac disease.  - The perianal and digital rectal examinations were normal.  - A 5 mm polyp was found in the proximal descending colon. The polyp was sessile. The polyp was removed with a cold  snare. Resection and retrieval were complete.  - A 15 to 16 mm polyp was found in the  rectum. The polyp was flat and Lyla classification IIb (flat). Preparations were  made for mucosal resection. 5 mL of orise was injected with adequate lift of the lesion from the muscularis propria.  Snare mucosal resection with suction (via the working channel) retrieval was performed. A 15 x 20 mm area was  resected. Resection and retrieval were complete. There was no bleeding during the procedure. To close a defect after  polypectomy, three hemostatic clips were successfully placed (MR conditional). There was no bleeding at the end of the  procedure. Area was tattooed with an injection of 4 mL of Tanya ink.  - A few small-mouthed diverticula were found in the sigmoid colon and ascending colon.  - Biopsies for histology were taken with a cold forceps from the right colon, left colon and transverse colon for evaluation  of microscopic colitis.  - The terminal ileum appeared normal. Biopsies were taken with a cold forceps for histology for diarrhea.   - Pathology showed unremarkable duodenal mucosa with preserved villous architecture, reactive gastropathy, negative H. pylori, negative for intestinal metaplasia of the antral biopsy, random colon biopsies were unremarkable, descending colon polyp was tubular adenoma negative for high-grade dysplasia, rectal polyp was tubular adenoma.    Assessment / Plan      1. Personal history of colonic polyps  2. Family history of colon cancer in father  Colonoscopy in 2021 with 15 to 16 mm rectal polyp removed, tubular adenoma without dysplasia.  He was recommended to have repeat colonoscopy in 6 months at that time to verify complete removal of the polyp, but did not have procedure.  There is a family history of colon cancer in his father diagnosed around age 74.  Colonoscopy for surveillance-patient is agreeable but does not want to schedule until this fall.     - Case Request; Standing  - Case Request  - sodium-potassium-magnesium sulfates (Suprep Bowel Prep Kit)  17.5-3.13-1.6 GM/177ML solution oral solution; Use as directed for colonoscopy prep. Patient has instructions.  Dispense: 177 mL; Refill: 0    3. Gastroesophageal reflux disease without esophagitis  Reflux reasonably controlled with Nexium 20 mg daily. Continue same. EGD unremarkable, no Hair's.  Denies difficulty swallowing.  Antireflux measures.    - esomeprazole (nexIUM) 20 MG capsule; Take 1 capsule by mouth Every Morning Before Breakfast.  Dispense: 30 capsule; Refill: 5    4. Early satiety  Early satiety doing better at this time.  No nausea or vomiting.  EGD unremarkable.  Symptoms likely related to diabetes.  CTAP in the future if symptoms worsen.    5. Irritable bowel syndrome with diarrhea  He has a history of intermittent diarrhea with lower abdominal cramping bloating for over 15 years.  Symptoms come and go, but are little better than they were in the past.  Denies rectal bleeding.  Colonoscopy with polyps removed, biopsies unremarkable.  Duodenal biopsies with preserved villous architecture, no evidence of celiac.  Avoid dairy as this can make symptoms worse.  Bentyl 20 mg 1 p.o. 3 times a day as needed for lower abdominal pain and diarrhea.  May take Imodium 2 mg 1/2-1 caplet 1-2 times a day as needed for diarrhea.    - dicyclomine (BENTYL) 20 MG tablet; Take 1 tablet by mouth 3 (Three) Times a Day As Needed (lower abdominal pain and diarrhea).  Dispense: 30 tablet; Refill: 1    Patient Instructions   1. Antireflux measures: Avoid fried, fatty foods, alcohol, chocolate, coffee, tea,  soft drinks, peppermint and spearmint, spicy foods, tomatoes and tomato based foods, onions, peppers, and smoking.   2. Other antireflux measures include weight reduction if overweight, avoiding tight clothing around the abdomen, elevating the head of the bed 6 inches with blocks under the head board, and don't drink or eat before going to bed and avoid lying down immediately after meals.  3. Avoid  vaping/smoking.  4. Esomeprazole 20 mg 1 by mouth in the am 30 minutes before breakfast.  5. High fiber, low fat diet with liberal water intake.   6. Bentyl 20 mg 1 po 3 times per day as needed for lower abdominal pain and diarrhea.   7. Imodium 2 mg 1/2-1 caplet 1-2 times per day as needed for >3 episodes of diarrhea.   8. Avoid dairy. May use lactose free/dairy free alternatives.  9. Colonoscopy: The indications, technique, alternatives and potential risk and complications were discussed with the patient including but not limited to bleeding, perforations, missing lesions and anesthetic complications. The patient understands and wishes to proceed with the procedure and has given their verbal consent. Written patient education information was given to the patient.   10. The patient will call if they have further questions before procedure.      Trinity Rogers, APRN  6/1/2022    Please note that portions of this note may have been completed with a voice recognition program. Efforts were made to edit the dictations, but occasionally words are mistranscribed.

## 2022-06-01 NOTE — PATIENT INSTRUCTIONS
Antireflux measures: Avoid fried, fatty foods, alcohol, chocolate, coffee, tea,  soft drinks, peppermint and spearmint, spicy foods, tomatoes and tomato based foods, onions, peppers, and smoking.   Other antireflux measures include weight reduction if overweight, avoiding tight clothing around the abdomen, elevating the head of the bed 6 inches with blocks under the head board, and don't drink or eat before going to bed and avoid lying down immediately after meals.  Avoid vaping/smoking.  Esomeprazole 20 mg 1 by mouth in the am 30 minutes before breakfast.  High fiber, low fat diet with liberal water intake.   Bentyl 20 mg 1 po 3 times per day as needed for lower abdominal pain and diarrhea.   Imodium 2 mg 1/2-1 caplet 1-2 times per day as needed for >3 episodes of diarrhea.   Avoid dairy. May use lactose free/dairy free alternatives.  Colonoscopy: The indications, technique, alternatives and potential risk and complications were discussed with the patient including but not limited to bleeding, perforations, missing lesions and anesthetic complications. The patient understands and wishes to proceed with the procedure and has given their verbal consent. Written patient education information was given to the patient.   The patient will call if they have further questions before procedure.

## 2022-10-10 ENCOUNTER — PREP FOR SURGERY (OUTPATIENT)
Dept: OTHER | Facility: HOSPITAL | Age: 57
End: 2022-10-10

## 2022-10-10 DIAGNOSIS — Z86.010 PERSONAL HISTORY OF COLONIC POLYPS: Primary | ICD-10-CM

## 2022-10-10 DIAGNOSIS — Z80.0 FAMILY HISTORY OF COLON CANCER IN FATHER: ICD-10-CM

## 2022-10-10 RX ORDER — SODIUM CHLORIDE 9 MG/ML
70 INJECTION, SOLUTION INTRAVENOUS CONTINUOUS PRN
Status: CANCELLED | OUTPATIENT
Start: 2022-10-10

## 2022-10-11 PROBLEM — Z86.010 PERSONAL HISTORY OF COLONIC POLYPS: Status: ACTIVE | Noted: 2022-10-11

## 2022-10-11 PROBLEM — Z86.0100 PERSONAL HISTORY OF COLONIC POLYPS: Status: ACTIVE | Noted: 2022-10-11

## 2022-10-11 PROBLEM — Z80.0 FAMILY HISTORY OF COLON CANCER IN FATHER: Status: ACTIVE | Noted: 2022-10-11

## 2022-10-17 ENCOUNTER — ANESTHESIA (OUTPATIENT)
Dept: GASTROENTEROLOGY | Facility: HOSPITAL | Age: 57
End: 2022-10-17

## 2022-10-17 ENCOUNTER — ANESTHESIA EVENT (OUTPATIENT)
Dept: GASTROENTEROLOGY | Facility: HOSPITAL | Age: 57
End: 2022-10-17

## 2022-10-17 ENCOUNTER — HOSPITAL ENCOUNTER (OUTPATIENT)
Facility: HOSPITAL | Age: 57
Setting detail: HOSPITAL OUTPATIENT SURGERY
Discharge: HOME | End: 2022-10-17
Attending: INTERNAL MEDICINE | Admitting: INTERNAL MEDICINE

## 2022-10-17 VITALS
DIASTOLIC BLOOD PRESSURE: 81 MMHG | OXYGEN SATURATION: 97 % | SYSTOLIC BLOOD PRESSURE: 133 MMHG | WEIGHT: 180 LBS | RESPIRATION RATE: 16 BRPM | HEIGHT: 62 IN | TEMPERATURE: 96.9 F | HEART RATE: 60 BPM | BODY MASS INDEX: 33.13 KG/M2

## 2022-10-17 DIAGNOSIS — Z80.0 FAMILY HISTORY OF COLON CANCER IN FATHER: ICD-10-CM

## 2022-10-17 DIAGNOSIS — Z86.010 PERSONAL HISTORY OF COLONIC POLYPS: ICD-10-CM

## 2022-10-17 PROCEDURE — 88305 TISSUE EXAM BY PATHOLOGIST: CPT

## 2022-10-17 PROCEDURE — 25010000002 PROPOFOL 200 MG/20ML EMULSION: Performed by: NURSE ANESTHETIST, CERTIFIED REGISTERED

## 2022-10-17 PROCEDURE — 45385 COLONOSCOPY W/LESION REMOVAL: CPT | Performed by: INTERNAL MEDICINE

## 2022-10-17 RX ORDER — SODIUM CHLORIDE 9 MG/ML
70 INJECTION, SOLUTION INTRAVENOUS CONTINUOUS PRN
Status: DISCONTINUED | OUTPATIENT
Start: 2022-10-17 | End: 2022-10-17 | Stop reason: HOSPADM

## 2022-10-17 RX ORDER — PROPOFOL 10 MG/ML
INJECTION, EMULSION INTRAVENOUS AS NEEDED
Status: DISCONTINUED | OUTPATIENT
Start: 2022-10-17 | End: 2022-10-17 | Stop reason: SURG

## 2022-10-17 RX ORDER — SIMETHICONE 20 MG/.3ML
EMULSION ORAL AS NEEDED
Status: DISCONTINUED | OUTPATIENT
Start: 2022-10-17 | End: 2022-10-17 | Stop reason: HOSPADM

## 2022-10-17 RX ORDER — LIDOCAINE HYDROCHLORIDE 20 MG/ML
INJECTION, SOLUTION INTRAVENOUS AS NEEDED
Status: DISCONTINUED | OUTPATIENT
Start: 2022-10-17 | End: 2022-10-17 | Stop reason: SURG

## 2022-10-17 RX ADMIN — PROPOFOL 50 MG: 10 INJECTION, EMULSION INTRAVENOUS at 08:21

## 2022-10-17 RX ADMIN — LIDOCAINE HYDROCHLORIDE 60 MG: 20 INJECTION, SOLUTION INTRAVENOUS at 08:13

## 2022-10-17 RX ADMIN — PROPOFOL 50 MG: 10 INJECTION, EMULSION INTRAVENOUS at 08:17

## 2022-10-17 RX ADMIN — SODIUM CHLORIDE 70 ML/HR: 9 INJECTION, SOLUTION INTRAVENOUS at 07:23

## 2022-10-17 RX ADMIN — PROPOFOL 50 MG: 10 INJECTION, EMULSION INTRAVENOUS at 08:29

## 2022-10-17 RX ADMIN — PROPOFOL 100 MG: 10 INJECTION, EMULSION INTRAVENOUS at 08:13

## 2022-10-17 RX ADMIN — PROPOFOL 50 MG: 10 INJECTION, EMULSION INTRAVENOUS at 08:26

## 2022-10-17 NOTE — H&P
[facility]  HISTORY AND PHYSICAL    Patient Name: Robinson Tovar  : 1965  MRN: [id number]    Chief Complaint:   For surveillance colonoscopy    History Of Presenting Illness:    Personal h/o advanced polyp removed 2021    Past Medical History:   Diagnosis Date   • Diabetes mellitus (HCC)    • Disease of thyroid gland    • Early satiety    • Elevated cholesterol    • Gastroparesis    • GERD (gastroesophageal reflux disease)    • Heart murmur     as a child   • Hyperlipidemia    • Hypertension        Past Surgical History:   Procedure Laterality Date   • COLONOSCOPY     • COLONOSCOPY N/A 2021    Procedure: COLONOSCOPY WITH BIOPSIES, AND RESOLUTION CLIPS;  Surgeon: Cheko Rogel MD;  Location: [facility] ENDOSCOPY;  Service: Gastroenterology;  Laterality: N/A;   • ENDOSCOPY N/A 2021    Procedure: ESOPHAGOGASTRODUODENOSCOPY WITH BIOPSIES;  Surgeon: Cheko Rogel MD;  Location: [facility] ENDOSCOPY;  Service: Gastroenterology;  Laterality: N/A;   • EYE SURGERY Right 2017    artificial lens placed   • KIDNEY STONE SURGERY         Social History     Socioeconomic History   • Marital status: Unknown   Tobacco Use   • Smoking status: Never   • Smokeless tobacco: Former     Types: Chew     Quit date: 2020   Vaping Use   • Vaping Use: Never used   Substance and Sexual Activity   • Alcohol use: Not Currently   • Drug use: Never   • Sexual activity: Defer       Family History   Problem Relation Age of Onset   • Diabetes Father    • Colon cancer Father 74   • Cirrhosis Neg Hx    • Liver disease Neg Hx    • Liver cancer Neg Hx        Prior to Admission Medications:  Medications Prior to Admission   Medication Sig Dispense Refill Last Dose   • ergocalciferol (ERGOCALCIFEROL) 1.25 MG (05721 UT) capsule Take 50,000 Units by mouth 1 (One) Time Per Week.   Past Week   • sodium-potassium-magnesium sulfates (Suprep Bowel Prep Kit) 17.5-3.13-1.6 GM/177ML solution oral solution Use as  directed for colonoscopy prep. Patient has instructions. 177 mL 0 10/17/2022 at 0400   • dicyclomine (BENTYL) 20 MG tablet Take 1 tablet by mouth 3 (Three) Times a Day As Needed (lower abdominal pain and diarrhea). 30 tablet 1    • esomeprazole (nexIUM) 20 MG capsule Take 1 capsule by mouth Every Morning Before Breakfast. 30 capsule 5    • insulin aspart (novoLOG FLEXPEN) 100 UNIT/ML solution pen-injector sc pen Inject  under the skin into the appropriate area as directed 3 (Three) Times a Day With Meals. 10-15 units sc with Meals (depends on blood sugar per pt report).   Per pharmacy @ [facility]:  Max dose 70 units TID with meals.   10/15/2022   • insulin detemir (LEVEMIR) 100 UNIT/ML injection Inject 80 Units under the skin into the appropriate area as directed Every Night.   10/15/2022   • losartan-hydrochlorothiazide (HYZAAR) 50-12.5 MG per tablet Take 1 tablet by mouth Daily.      • Pseudoephedrine-Naproxen Na ER (Aleve-D Sinus & Cold) 120-220 MG tablet sustained-release 12 hour Take  by mouth As Needed.      • sildenafil (VIAGRA) 100 MG tablet TAKE 1 TABLET BY MOUTH AS DIRECTED 1 HOUR PRIOR TO SEXUAL ACTIVITY          Allergies:  Allergies   Allergen Reactions   • Sulfa Antibiotics Anaphylaxis   • Apidra [Insulin Glulisine] Nausea And Vomiting   • Basaglar Kwikpen [Insulin Glargine] Nausea And Vomiting     Nausea, vomiting, diarrhea   • Humalog [Insulin Lispro] GI Intolerance   • Tresiba [Insulin Degludec] Diarrhea     Diarrhea and stomach cramps   • Xultophy [Insulin Degludec-Liraglutide] Diarrhea     Diarrhea and stomach cramps   • Penicillins Other (See Comments)     Pt unsure of reaction          Vitals: Temp:  [98.5 °F (36.9 °C)] 98.5 °F (36.9 °C)  Heart Rate:  [93] 93  Resp:  [16] 16  BP: (182)/(107) 182/107    Review Of Systems:  Constitutional:  Negative for chills, fever, and unexpected weight change.  Respiratory:  Negative for cough, chest tightness, shortness of breath, and  wheezing.  Cardiovascular:  Negative for chest pain, palpitations, and leg swelling.  Gastrointestinal:  Negative for abdominal distention, abdominal pain, Nausea, vomiting.  Neurological:  Negative for Weakness, numbness, and headaches.     Physical Exam:    General Appearance:  Alert, cooperative, in no acute distress.   Lungs:   Clear to auscultation, respirations regular, even and                 unlabored.   Heart:  Regular rhythm and normal rate.   Abdomen:   Normal bowel sounds, no masses, no organomegaly. Soft, non-tender, non-distended   Neurologic: Alert and oriented x 3. Moves all four limbs equally       Plan: COLONOSCOPY (N/A)     Cheko Rogel MD  10/17/2022

## 2022-10-17 NOTE — ANESTHESIA POSTPROCEDURE EVALUATION
Patient: Robinson Tovar    Procedure Summary     Date: 10/17/22 Room / Location: [facility] ENDOSCOPY 1 / [facility] ENDOSCOPY    Anesthesia Start: 0811 Anesthesia Stop: 0837    Procedure: COLONOSCOPY with polypectomy  (Anus) Diagnosis:       Personal history of colonic polyps      Family history of colon cancer in father      (Personal history of colonic polyps [Z86.010])      (Family history of colon cancer in father [Z80.0])    Surgeons: Cheko Rogel MD Provider: Ameya Avina CRNA    Anesthesia Type: MAC ASA Status: 2          Anesthesia Type: MAC    Vitals  No vitals data found for the desired time range.          Post Anesthesia Care and Evaluation    Patient location during evaluation: bedside  Patient participation: complete - patient participated  Level of consciousness: awake and alert  Pain score: 0  Pain management: adequate    Airway patency: patent  Anesthetic complications: No anesthetic complications  PONV Status: none  Cardiovascular status: acceptable  Respiratory status: acceptable  Hydration status: acceptable

## 2022-10-17 NOTE — DISCHARGE INSTRUCTIONS
- Discharge patient to home (ambulatory).   - Resume previous diet.   - Continue present medications.   - Await pathology results.   - Repeat colonoscopy in 5 years for surveillance (only if prior EMR site pathology is hyperplastic).   - Return to GI office in 8 weeks.     Rest today.  No pushing, pulling, tugging,  heavy lifting, or strenuous activity.  No major decision making, driving, or drinking alcoholic beverages for 24 hours. ( due to the medications you have  received)  Always use good hand hygiene/washing techniques.  NO driving while taking pain medications.    To assist you in voiding:  Drink plenty of fluids  Listen to running water while attempting to void.    If you are unable to urinate and you have an uncomfortable urge to void or it has been   6 hours since you were discharged, return to the Emergency Room

## 2022-10-17 NOTE — ANESTHESIA PREPROCEDURE EVALUATION
Anesthesia Evaluation     Patient summary reviewed and Nursing notes reviewed   NPO Solid Status: > 8 hours  NPO Liquid Status: > 8 hours           Airway   Mallampati: II  TM distance: >3 FB  Neck ROM: full  No difficulty expected  Dental - normal exam     Pulmonary - negative pulmonary ROS and normal exam   Cardiovascular - normal exam  Exercise tolerance: good (4-7 METS)    (+) hypertension well controlled less than 2 medications, valvular problems/murmurs murmur, hyperlipidemia,       Neuro/Psych- negative ROS  GI/Hepatic/Renal/Endo    (+)  GERD well controlled,  diabetes mellitus type 1 well controlled using insulin, thyroid problem     Musculoskeletal (-) negative ROS    Abdominal  - normal exam   Substance History - negative use     OB/GYN negative ob/gyn ROS         Other                          Anesthesia Plan    ASA 2     MAC     (Risks and benefits discussed including risk of aspiration, recall and dental damage. All patient questions answered.    Will continue with plan of care.)  intravenous induction     Anesthetic plan, risks, benefits, and alternatives have been provided, discussed and informed consent has been obtained with: patient.  Pre-procedure education provided  Plan discussed with CRNA.

## 2022-10-18 LAB — REF LAB TEST METHOD: NORMAL

## 2023-01-23 ENCOUNTER — OFFICE VISIT (OUTPATIENT)
Dept: GASTROENTEROLOGY | Facility: CLINIC | Age: 58
End: 2023-01-23
Payer: COMMERCIAL

## 2023-01-23 VITALS
DIASTOLIC BLOOD PRESSURE: 82 MMHG | SYSTOLIC BLOOD PRESSURE: 122 MMHG | WEIGHT: 192 LBS | HEIGHT: 62 IN | BODY MASS INDEX: 35.33 KG/M2

## 2023-01-23 DIAGNOSIS — K58.0 IRRITABLE BOWEL SYNDROME WITH DIARRHEA: Primary | Chronic | ICD-10-CM

## 2023-01-23 DIAGNOSIS — K21.9 GASTROESOPHAGEAL REFLUX DISEASE WITHOUT ESOPHAGITIS: Chronic | ICD-10-CM

## 2023-01-23 DIAGNOSIS — D12.5 ADENOMATOUS POLYP OF SIGMOID COLON: ICD-10-CM

## 2023-01-23 DIAGNOSIS — R68.81 EARLY SATIETY: Chronic | ICD-10-CM

## 2023-01-23 PROCEDURE — 99214 OFFICE O/P EST MOD 30 MIN: CPT | Performed by: NURSE PRACTITIONER

## 2023-01-23 RX ORDER — DICYCLOMINE HCL 20 MG
20 TABLET ORAL 3 TIMES DAILY PRN
Qty: 30 TABLET | Refills: 1 | Status: SHIPPED | OUTPATIENT
Start: 2023-01-23

## 2023-01-23 NOTE — PROGRESS NOTES
Follow Up Note     Date: 2023   Patient Name: Robinson Tovar  MRN: [id number]  : 1965     Primary Care Provider: Eleonora Garcia APRN     Chief Complaint   Patient presents with   • Follow-up   • Colon Polyps     History of present illness:   2023  Robinson Tovar is a 57 y.o. male who is here today for follow up after colonoscopy. He needs a refill of his Nexium. Abdominal cramping and diarrhea is much better, he rarely has any problems. Denies GI bleeding. No weight loss. No problem with early satiety right now.     Interval History:  2022  Robinson Tovar is a 57 y.o. male who is here today for follow up. He has been feeling better. He is taking Nexium 20 mg daily and reflux is doing better. No difficulty swallowing. No problems with early satiety at this time. Bowel habits improved, he still has occasional abdominal cramping and diarrhea, but not as frequent. 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    3/16/2021  He has a chronic episodic diarrhea for about 15-20 yrs. He normally gets one soft bowel movement but intermittently gets episodes of diarhea. He gets these episodes once in a week or so, last for 3-4 days and clears off. Associated abdominal rumbling. He feels like full all day without any hunger. He will get lot of gas and benching.      This patient deny any abdominal pain, no recent change in bowel habit, hematochezia or melena.  Weight is stable. Pt denies nausea, vomiting or odynophagia or dysphagia. There is  history of occasional acid reflux. There is no history of anemia. No prior recent history of EGD or colonoscopy. Family history of colon cancer- Dad at the age of 70. No history of any abdominal surgery. Denies alcohol abuse or cigarette smoking. He quit chewing tobacco.      He has a diabetes mellitus with noncompliance  with medication.  He is currently on insulin.     Subjective      Past Medical History:   Diagnosis Date   • Diabetes mellitus (HCC)    • Disease of thyroid gland    • Early satiety    • Elevated cholesterol    • Gastroparesis    • GERD (gastroesophageal reflux disease)    • Heart murmur     as a child   • Hyperlipidemia    • Hypertension      Past Surgical History:   Procedure Laterality Date   • COLONOSCOPY     • COLONOSCOPY N/A 4/23/2021    Procedure: COLONOSCOPY WITH BIOPSIES, AND RESOLUTION CLIPS;  Surgeon: Cheko Rogel MD;  Location: [facility] ENDOSCOPY;  Service: Gastroenterology;  Laterality: N/A;   • COLONOSCOPY N/A 10/17/2022    Procedure: COLONOSCOPY with polypectomy ;  Surgeon: Cheko Rogel MD;  Location: [facility] ENDOSCOPY;  Service: Gastroenterology;  Laterality: N/A;   • ENDOSCOPY N/A 4/23/2021    Procedure: ESOPHAGOGASTRODUODENOSCOPY WITH BIOPSIES;  Surgeon: Cheko Rogel MD;  Location: [facility] ENDOSCOPY;  Service: Gastroenterology;  Laterality: N/A;   • EYE SURGERY Right 2017    artificial lens placed   • KIDNEY STONE SURGERY       Family History   Problem Relation Age of Onset   • Diabetes Father    • Colon cancer Father 74   • Cirrhosis Neg Hx    • Liver disease Neg Hx    • Liver cancer Neg Hx      Social History     Socioeconomic History   • Marital status: Unknown   Tobacco Use   • Smoking status: Never   • Smokeless tobacco: Former     Types: Chew     Quit date: 4/22/2020   Vaping Use   • Vaping Use: Never used   Substance and Sexual Activity   • Alcohol use: Not Currently   • Drug use: Never   • Sexual activity: Defer       Current Outpatient Medications:   •  dicyclomine (BENTYL) 20 MG tablet, Take 1 tablet by mouth 3 (Three) Times a Day As Needed (lower abdominal pain and diarrhea)., Disp: 30 tablet, Rfl: 1  •  ergocalciferol (ERGOCALCIFEROL) 1.25 MG (36627 UT) capsule, Take 50,000 Units by mouth 1 (One) Time Per Week., Disp: , Rfl:   •  esomeprazole (nexIUM) 20 MG  capsule, Take 1 capsule by mouth Every Morning Before Breakfast., Disp: 90 capsule, Rfl: 3  •  insulin aspart (novoLOG FLEXPEN) 100 UNIT/ML solution pen-injector sc pen, Inject  under the skin into the appropriate area as directed 3 (Three) Times a Day With Meals. 10-15 units sc with Meals (depends on blood sugar per pt report).   Per pharmacy @ [facility]:  Max dose 70 units TID with meals., Disp: , Rfl:   •  insulin detemir (LEVEMIR) 100 UNIT/ML injection, Inject 80 Units under the skin into the appropriate area as directed Every Night., Disp: , Rfl:   •  sildenafil (VIAGRA) 100 MG tablet, TAKE 1 TABLET BY MOUTH AS DIRECTED 1 HOUR PRIOR TO SEXUAL ACTIVITY, Disp: , Rfl:   •  losartan-hydrochlorothiazide (HYZAAR) 50-12.5 MG per tablet, Take 1 tablet by mouth Daily., Disp: , Rfl:   •  Pseudoephedrine-Naproxen Na ER (Aleve-D Sinus & Cold) 120-220 MG tablet sustained-release 12 hour, Take  by mouth As Needed., Disp: , Rfl:      Allergies   Allergen Reactions   • Sulfa Antibiotics Anaphylaxis   • Apidra [Insulin Glulisine] Nausea And Vomiting   • Basaglar Kwikpen [Insulin Glargine] Nausea And Vomiting     Nausea, vomiting, diarrhea   • Humalog [Insulin Lispro] GI Intolerance   • Tresiba [Insulin Degludec] Diarrhea     Diarrhea and stomach cramps   • Xultophy [Insulin Degludec-Liraglutide] Diarrhea     Diarrhea and stomach cramps   • Penicillins Other (See Comments)     Pt unsure of reaction       The following portions of the patient's history were reviewed and updated as appropriate: allergies, current medications, past family history, past medical history, past social history, past surgical history and problem list.  Objective     Physical Exam  Vitals and nursing note reviewed.   Constitutional:       General: He is not in acute distress.     Appearance: Normal appearance. He is well-developed.   HENT:      Head: Normocephalic and atraumatic.      Mouth/Throat:      Mouth: Mucous membranes are not pale, not  "dry and not cyanotic.   Eyes:      General: Lids are normal.   Neck:      Trachea: Trachea normal.   Cardiovascular:      Rate and Rhythm: Normal rate.   Pulmonary:      Effort: Pulmonary effort is normal. No respiratory distress.      Breath sounds: Normal breath sounds.   Abdominal:      Tenderness: There is no abdominal tenderness.   Skin:     General: Skin is warm and dry.   Neurological:      Mental Status: He is alert and oriented to person, place, and time.   Psychiatric:         Mood and Affect: Mood normal.         Speech: Speech normal.         Behavior: Behavior normal. Behavior is cooperative.       Vitals:    23 1544   BP: 122/82   Weight: 87.1 kg (192 lb)   Height: 157.5 cm (62\")     Body mass index is 35.12 kg/m².     Results Review:   I reviewed the patient's new clinical results.    No visits with results within 90 Day(s) from this visit.   Latest known visit with results is:   Admission on 10/17/2022, Discharged on 10/17/2022   Component Date Value Ref Range Status   • Reference Lab Report 10/17/2022    Final                    Value:[facility]  [address] [place] [address]  Phone: [phone] or [phone]  Fax: [phone]  Ramon Velarde M.D., Medical Director    PATIENT NAME                           LABORATORY NO.  EHSAN MANCINI                      [id number]  [id number]                         AGE              SEX  SSN           CLIENT REF #  [facility]            57      1965      [id number]   [id number]    [address]                REQUESTING M.D.     ATTENDING M.D.     COPY TO.   [address]                        MICHELL MOSQUEDA [address]  DATE COLLECTED      DATE RECEIVED      DATE REPORTED  10/17/2022          10/17/2022         10/18/2022    DIAGNOSIS:  A.   SIGMOID COLON POLYP:  Tubular adenoma; negative for high-grade dysplasia.  B.   " RECTAL POLYP:  Hyperplastic polyp.    WJB    CLINICAL HISTORY:  Personal history of colonic polyps  Family history of colon cancer                           in father    REVIEWED, DIAGNOSED AND ELECTRONICALLY  SIGNED BY:    Galileo Blancas D.O.  CPT CODES:  88305x2        EGD and colonoscopy were completed by Dr. Rogel on 4/23/2021:  - The oropharynx was normal.  - The Z-line was regular and was found 36 cm from the incisors.  - No gross lesions were noted in the entire esophagus.  - Patchy mildly erythematous mucosa without bleeding was found on the posterior wall of the stomach and in the gastric antrum. Biopsies were taken with a cold forceps for Helicobacter pylori testing. Biopsies were taken with a cold forceps for Helicobacter pylori testing.  - The duodenal bulb, first portion of the duodenum, second portion of the duodenum and third portion of the duodenum were normal. Biopsies for histology were taken with a cold forceps for evaluation of celiac disease.  - The perianal and digital rectal examinations were normal.  - A 5 mm polyp was found in the proximal descending colon. The polyp was sessile. The polyp was removed with a cold snare. Resection and retrieval were complete.  - A 15 to 16 mm polyp was found in the rectum. The polyp was flat and Lyla classification IIb (flat). Preparations were made for mucosal resection. 5 mL of orise was injected with adequate lift of the lesion from the muscularis propria.  Snare mucosal resection with suction (via the working channel) retrieval was performed. A 15 x 20 mm area was resected. Resection and retrieval were complete. There was no bleeding during the procedure. To close a defect after polypectomy, three hemostatic clips were successfully placed (MR conditional). There was no bleeding at the end of the procedure. Area was tattooed with an injection of 4 mL of Tanya ink.  - A few small-mouthed diverticula were found in the sigmoid colon and ascending  colon.  - Biopsies for histology were taken with a cold forceps from the right colon, left colon and transverse colon for evaluation of microscopic colitis.  - The terminal ileum appeared normal. Biopsies were taken with a cold forceps for histology for diarrhea.   - Pathology showed unremarkable duodenal mucosa with preserved villous architecture, reactive gastropathy, negative H. pylori, negative for intestinal metaplasia of the antral biopsy, random colon biopsies were unremarkable, descending colon polyp was tubular adenoma negative for high-grade dysplasia, rectal polyp was tubular adenoma.    Colonoscopy dated 10/17/2022 per Dr. Rogel  - One 6 mm polyp in the distal sigmoid colon, removed with a cold snare. Resected and retrieved.  - A tattoo was seen in the distal rectum. The tattoo site appeared normal.  - Two small 3- 4 mm polypoid area of mucosa at prior EMR site edge was found in the distal rectum removed with a cold snare. Resected and retrieved. Clinically hyperplastic.  - Diverticulosis in the sigmoid colon.  - The examined portion of the ileum was normal.  A.   SIGMOID COLON POLYP:   Tubular adenoma; negative for high-grade dysplasia.   B.   RECTAL POLYP:   Hyperplastic polyp.    Assessment / Plan      1. Irritable bowel syndrome with diarrhea  He has a history of lower abdominal cramping and diarrhea for over 15 years that is doing well right now. Symptoms come and go depending on what he is eating, but do not occur as often as they did in the past. Denies GI bleeding. Colonoscopy with polyps removed, biopsies unremarkable.  Duodenal biopsies with preserved villous architecture, no evidence of celiac. Likely IBS-D.  Low FODMAP diet - avoid all dairy.   Bentyl 20 mg 1 po 3 times per day as needed.   Imodium as needed.    - dicyclomine (BENTYL) 20 MG tablet; Take 1 tablet by mouth 3 (Three) Times a Day As Needed (lower abdominal pain and diarrhea).  Dispense: 30 tablet; Refill: 1    2.  Gastroesophageal reflux disease without esophagitis  Reflux reasonably controlled with Nexium 20 mg daily. If he misses a dose, reflux returns. No difficulty swallowing. EGD unremarkable, no Hair's.   Anti-reflux measures.  Continue Nexium 20 mg daily for now. Try to decrease to use as needed in the future.     - esomeprazole (nexIUM) 20 MG capsule; Take 1 capsule by mouth Every Morning Before Breakfast.  Dispense: 90 capsule; Refill: 3    3. Early satiety  He has not had any problems with early satiety at this time. No nausea or vomiting. Denies GI bleeding. Colonoscopy and EGD unremarkable. Diabetes can be causing/contritubing to his symptoms.  Will monitor for now as he is not symptomatic.  CTAP in the future if symptoms return.     4. Adenomatous polyp of sigmoid colon  Colonoscopy in 2021 with 15 to 16 mm rectal polyp removed, tubular adenoma without dysplasia. Follow up colonoscopy dated 10/17/2022 with 2 polyps removed, one tubular adenoma without dysplasia in sigmoid colon, one polyp removed from prior EMR site-hyperplastic.  There is a family history of colon cancer in his father diagnosed around age 74.  Colonoscopy for surveillance in 5 years, 2027, as polyp from prior EMR site hyperplastic.    Patient Instructions   1. Antireflux measures: Avoid fried, fatty foods, alcohol, chocolate, coffee, tea,  soft drinks, peppermint and spearmint, spicy foods, tomatoes and tomato based foods, onions, peppers, and smoking.   2. Other antireflux measures include weight reduction if overweight, avoiding tight clothing around the abdomen, elevating the head of the bed 6 inches with blocks under the head board, and don't drink or eat before going to bed and avoid lying down immediately after meals.  3. Avoid vaping/smoking.  4. Esomeprazole 20 mg 1 by mouth in the am 30 minutes before breakfast.  5. High fiber, low fat diet with liberal water intake.   6. Bentyl 20 mg 1 po 3 times per day as needed for lower  abdominal pain and diarrhea.   7. Imodium 2 mg 1/2-1 caplet 1-2 times per day as needed for >3 episodes of diarrhea.   8. Low FODMAP diet - Avoid dairy. May use lactose free/dairy free alternatives.  9. Colonoscopy for surveillance in 5 years, October 2027.  10. Follow up: 1 year or sooner if needed.    Low-FODMAP Eating Plan  FODMAP stands for fermentable oligosaccharides, disaccharides, monosaccharides, and polyols. These are sugars that are hard for some people to digest. A low-FODMAP eating plan may help some people who have irritable bowel syndrome (IBS) and certain other bowel (intestinal) diseases to manage their symptoms.  This meal plan can be complicated to follow. Work with a diet and nutrition specialist (dietitian) to make a low-FODMAP eating plan that is right for you. A dietitian can help make sure that you get enough nutrition from this diet.  What are tips for following this plan?  Reading food labels  1. Check labels for hidden FODMAPs such as:  ? High-fructose syrup.  ? Honey.  ? Agave.  ? Natural fruit flavors.  ? Onion or garlic powder.  2. Choose low-FODMAP foods that contain 3-4 grams of fiber per serving.  3. Check food labels for serving sizes. Eat only one serving at a time to make sure FODMAP levels stay low.  Shopping  • Shop with a list of foods that are recommended on this diet and make a meal plan.  Meal planning  1. Follow a low-FODMAP eating plan for up to 6 weeks, or as told by your health care provider or dietitian.  2. To follow the eating plan:  1. Eliminate high-FODMAP foods from your diet completely. Choose only low-FODMAP foods to eat. You will do this for 2-6 weeks.  2. Gradually reintroduce high-FODMAP foods into your diet one at a time. Most people should wait a few days before introducing the next new high-FODMAP food into their meal plan. Your dietitian can recommend how quickly you may reintroduce foods.  3. Keep a daily record of what and how much you eat and drink.  "Make note of any symptoms that you have after eating.  4. Review your daily record with a dietitian regularly to identify which foods you can eat and which foods you should avoid.  General tips  • Drink enough fluid each day to keep your urine pale yellow.  • Avoid processed foods. These often have added sugar and may be high in FODMAPs.  • Avoid most dairy products, whole grains, and sweeteners.  • Work with a dietitian to make sure you get enough fiber in your diet.  • Avoid high FODMAP foods at meals to manage symptoms.    Recommended foods  Fruits  Bananas, oranges, tangerines, kelly, limes, blueberries, raspberries, strawberries, grapes, cantaloupe, honeydew melon, kiwi, papaya, passion fruit, and pineapple. Limited amounts of dried cranberries, banana chips, and shredded coconut.  Vegetables  Eggplant, zucchini, cucumber, peppers, green beans, bean sprouts, lettuce, arugula, kale, Swiss chard, spinach, mahesh greens, bok estephania, summer squash, potato, and tomato. Limited amounts of corn, carrot, and sweet potato. Green parts of scallions.  Grains  Gluten-free grains, such as rice, oats, buckwheat, quinoa, corn, polenta, and millet. Gluten-free pasta, bread, or cereal. Rice noodles. Corn tortillas.  Meats and other proteins  Unseasoned beef, pork, poultry, or fish. Eggs. Springer. Tofu (firm) and tempeh. Limited amounts of nuts and seeds, such as almonds, walnuts, brazil nuts, pecans, peanuts, nut butters, pumpkin seeds, efren seeds, and sunflower seeds.  Dairy  Lactose-free milk, yogurt, and kefir. Lactose-free cottage cheese and ice cream. Non-dairy milks, such as almond, coconut, hemp, and rice milk. Non-dairy yogurt. Limited amounts of goat cheese, brie, mozzarella, parmesan, swiss, and other hard cheeses.  Fats and oils  Butter-free spreads. Vegetable oils, such as olive, canola, and sunflower oil.  Seasoning and other foods  Artificial sweeteners with names that do not end in \"ol,\" such as aspartame, " saccharine, and stevia. Maple syrup, white table sugar, raw sugar, brown sugar, and molasses. Mayonnaise, soy sauce, and tamari. Fresh basil, coriander, parsley, rosemary, and thyme.  Beverages  Water and mineral water. Sugar-sweetened soft drinks. Small amounts of orange juice or cranberry juice. Black and green tea. Most dry arlene. Coffee.  The items listed above may not be a complete list of foods and beverages you can eat. Contact a dietitian for more information.    Foods to avoid  Fruits  Fresh, dried, and juiced forms of apple, pear, watermelon, peach, plum, cherries, apricots, blackberries, boysenberries, figs, nectarines, and liliana. Avocado.  Vegetables  Chicory root, artichoke, asparagus, cabbage, snow peas, [place] sprouts, broccoli, sugar snap peas, mushrooms, celery, and cauliflower. Onions, garlic, leeks, and the white part of scallions.  Grains  Wheat, including kamut, durum, and semolina. Barley and bulgur. Couscous. Wheat-based cereals. Wheat noodles, bread, crackers, and pastries.  Meats and other proteins  Fried or fatty meat. Sausage. Cashews and pistachios. Soybeans, baked beans, black beans, chickpeas, kidney beans, anthony beans, navy beans, lentils, black-eyed peas, and split peas.  Dairy  Milk, yogurt, ice cream, and soft cheese. Cream and sour cream. Milk-based sauces. Custard. Buttermilk. Soy milk.  Seasoning and other foods  Any sugar-free gum or candy. Foods that contain artificial sweeteners such as sorbitol, mannitol, isomalt, or xylitol. Foods that contain honey, high-fructose corn syrup, or agave. Bouillon, vegetable stock, beef stock, and chicken stock. Garlic and onion powder. Condiments made with onion, such as hummus, chutney, pickles, relish, salad dressing, and salsa. Tomato paste.  Beverages  Chicory-based drinks. Coffee substitutes. Chamomile tea. Fennel tea. Sweet or fortified arlene such as port or vito. Diet soft drinks made with isomalt, mannitol, maltitol, sorbitol, or  xylitol. Apple, pear, and liliana juice. Juices with high-fructose corn syrup.  The items listed above may not be a complete list of foods and beverages you should avoid. Contact a dietitian for more information.  Summary  • FODMAP stands for fermentable oligosaccharides, disaccharides, monosaccharides, and polyols. These are sugars that are hard for some people to digest.  • A low-FODMAP eating plan is a short-term diet that helps to ease symptoms of certain bowel diseases.  • The eating plan usually lasts up to 6 weeks. After that, high-FODMAP foods are reintroduced gradually and one at a time. This can help you find out which foods may be causing symptoms.  • A low-FODMAP eating plan can be complicated. It is best to work with a dietitian who has experience with this type of plan.  This information is not intended to replace advice given to you by your health care provider. Make sure you discuss any questions you have with your health care provider.  Document Revised: 05/06/2021 Document Reviewed: 05/06/2021  [employer] Patient Education © 2022 [employer]    Trinity Rogers, APRN  1/23/2023    Please note that portions of this note were completed with a voice recognition program.

## 2023-01-23 NOTE — PATIENT INSTRUCTIONS
Antireflux measures: Avoid fried, fatty foods, alcohol, chocolate, coffee, tea,  soft drinks, peppermint and spearmint, spicy foods, tomatoes and tomato based foods, onions, peppers, and smoking.   Other antireflux measures include weight reduction if overweight, avoiding tight clothing around the abdomen, elevating the head of the bed 6 inches with blocks under the head board, and don't drink or eat before going to bed and avoid lying down immediately after meals.  Avoid vaping/smoking.  Esomeprazole 20 mg 1 by mouth in the am 30 minutes before breakfast.  High fiber, low fat diet with liberal water intake.   Bentyl 20 mg 1 po 3 times per day as needed for lower abdominal pain and diarrhea.   Imodium 2 mg 1/2-1 caplet 1-2 times per day as needed for >3 episodes of diarrhea.   Low FODMAP diet - Avoid dairy. May use lactose free/dairy free alternatives.  Colonoscopy for surveillance in 5 years, October 2027.  Follow up: 1 year or sooner if needed.    Low-FODMAP Eating Plan  FODMAP stands for fermentable oligosaccharides, disaccharides, monosaccharides, and polyols. These are sugars that are hard for some people to digest. A low-FODMAP eating plan may help some people who have irritable bowel syndrome (IBS) and certain other bowel (intestinal) diseases to manage their symptoms.  This meal plan can be complicated to follow. Work with a diet and nutrition specialist (dietitian) to make a low-FODMAP eating plan that is right for you. A dietitian can help make sure that you get enough nutrition from this diet.  What are tips for following this plan?  Reading food labels  Check labels for hidden FODMAPs such as:  High-fructose syrup.  Honey.  Agave.  Natural fruit flavors.  Onion or garlic powder.  Choose low-FODMAP foods that contain 3-4 grams of fiber per serving.  Check food labels for serving sizes. Eat only one serving at a time to make sure FODMAP levels stay low.  Shopping  Shop with a list of foods that are  recommended on this diet and make a meal plan.  Meal planning  Follow a low-FODMAP eating plan for up to 6 weeks, or as told by your health care provider or dietitian.  To follow the eating plan:  Eliminate high-FODMAP foods from your diet completely. Choose only low-FODMAP foods to eat. You will do this for 2-6 weeks.  Gradually reintroduce high-FODMAP foods into your diet one at a time. Most people should wait a few days before introducing the next new high-FODMAP food into their meal plan. Your dietitian can recommend how quickly you may reintroduce foods.  Keep a daily record of what and how much you eat and drink. Make note of any symptoms that you have after eating.  Review your daily record with a dietitian regularly to identify which foods you can eat and which foods you should avoid.  General tips  Drink enough fluid each day to keep your urine pale yellow.  Avoid processed foods. These often have added sugar and may be high in FODMAPs.  Avoid most dairy products, whole grains, and sweeteners.  Work with a dietitian to make sure you get enough fiber in your diet.  Avoid high FODMAP foods at meals to manage symptoms.    Recommended foods  Fruits  Bananas, oranges, tangerines, kelly, limes, blueberries, raspberries, strawberries, grapes, cantaloupe, honeydew melon, kiwi, papaya, passion fruit, and pineapple. Limited amounts of dried cranberries, banana chips, and shredded coconut.  Vegetables  Eggplant, zucchini, cucumber, peppers, green beans, bean sprouts, lettuce, arugula, kale, Swiss chard, spinach, mahesh greens, bok estephania, summer squash, potato, and tomato. Limited amounts of corn, carrot, and sweet potato. Green parts of scallions.  Grains  Gluten-free grains, such as rice, oats, buckwheat, quinoa, corn, polenta, and millet. Gluten-free pasta, bread, or cereal. Rice noodles. Corn tortillas.  Meats and other proteins  Unseasoned beef, pork, poultry, or fish. Eggs. Springer. Tofu (firm) and tempeh. Limited  "amounts of nuts and seeds, such as almonds, walnuts, brazil nuts, pecans, peanuts, nut butters, pumpkin seeds, efren seeds, and sunflower seeds.  Dairy  Lactose-free milk, yogurt, and kefir. Lactose-free cottage cheese and ice cream. Non-dairy milks, such as almond, coconut, hemp, and rice milk. Non-dairy yogurt. Limited amounts of goat cheese, brie, mozzarella, parmesan, swiss, and other hard cheeses.  Fats and oils  Butter-free spreads. Vegetable oils, such as olive, canola, and sunflower oil.  Seasoning and other foods  Artificial sweeteners with names that do not end in \"ol,\" such as aspartame, saccharine, and stevia. Maple syrup, white table sugar, raw sugar, brown sugar, and molasses. Mayonnaise, soy sauce, and tamari. Fresh basil, coriander, parsley, rosemary, and thyme.  Beverages  Water and mineral water. Sugar-sweetened soft drinks. Small amounts of orange juice or cranberry juice. Black and green tea. Most dry arlene. Coffee.  The items listed above may not be a complete list of foods and beverages you can eat. Contact a dietitian for more information.    Foods to avoid  Fruits  Fresh, dried, and juiced forms of apple, pear, watermelon, peach, plum, cherries, apricots, blackberries, boysenberries, figs, nectarines, and liliana. Avocado.  Vegetables  Chicory root, artichoke, asparagus, cabbage, snow peas, [place] sprouts, broccoli, sugar snap peas, mushrooms, celery, and cauliflower. Onions, garlic, leeks, and the white part of scallions.  Grains  Wheat, including kamut, durum, and semolina. Barley and bulgur. Couscous. Wheat-based cereals. Wheat noodles, bread, crackers, and pastries.  Meats and other proteins  Fried or fatty meat. Sausage. Cashews and pistachios. Soybeans, baked beans, black beans, chickpeas, kidney beans, anthony beans, navy beans, lentils, black-eyed peas, and split peas.  Dairy  Milk, yogurt, ice cream, and soft cheese. Cream and sour cream. Milk-based sauces. Custard. Buttermilk. Soy " milk.  Seasoning and other foods  Any sugar-free gum or candy. Foods that contain artificial sweeteners such as sorbitol, mannitol, isomalt, or xylitol. Foods that contain honey, high-fructose corn syrup, or agave. Bouillon, vegetable stock, beef stock, and chicken stock. Garlic and onion powder. Condiments made with onion, such as hummus, chutney, pickles, relish, salad dressing, and salsa. Tomato paste.  Beverages  Chicory-based drinks. Coffee substitutes. Chamomile tea. Fennel tea. Sweet or fortified arlene such as port or vito. Diet soft drinks made with isomalt, mannitol, maltitol, sorbitol, or xylitol. Apple, pear, and liliana juice. Juices with high-fructose corn syrup.  The items listed above may not be a complete list of foods and beverages you should avoid. Contact a dietitian for more information.  Summary  FODMAP stands for fermentable oligosaccharides, disaccharides, monosaccharides, and polyols. These are sugars that are hard for some people to digest.  A low-FODMAP eating plan is a short-term diet that helps to ease symptoms of certain bowel diseases.  The eating plan usually lasts up to 6 weeks. After that, high-FODMAP foods are reintroduced gradually and one at a time. This can help you find out which foods may be causing symptoms.  A low-FODMAP eating plan can be complicated. It is best to work with a dietitian who has experience with this type of plan.  This information is not intended to replace advice given to you by your health care provider. Make sure you discuss any questions you have with your health care provider.  Document Revised: 05/06/2021 Document Reviewed: 05/06/2021  Elsevier Patient Education © 2022 [employer]

## 2024-01-22 ENCOUNTER — OFFICE VISIT (OUTPATIENT)
Dept: GASTROENTEROLOGY | Facility: CLINIC | Age: 59
End: 2024-01-22
Payer: COMMERCIAL

## 2024-01-22 VITALS
HEIGHT: 62 IN | BODY MASS INDEX: 36.25 KG/M2 | RESPIRATION RATE: 14 BRPM | DIASTOLIC BLOOD PRESSURE: 80 MMHG | OXYGEN SATURATION: 95 % | SYSTOLIC BLOOD PRESSURE: 122 MMHG | HEART RATE: 65 BPM | WEIGHT: 197 LBS

## 2024-01-22 DIAGNOSIS — R19.7 DIARRHEA, UNSPECIFIED TYPE: Chronic | ICD-10-CM

## 2024-01-22 DIAGNOSIS — K21.9 GASTROESOPHAGEAL REFLUX DISEASE WITHOUT ESOPHAGITIS: Chronic | ICD-10-CM

## 2024-01-22 DIAGNOSIS — R10.30 LOWER ABDOMINAL PAIN: Chronic | ICD-10-CM

## 2024-01-22 DIAGNOSIS — K58.0 IRRITABLE BOWEL SYNDROME WITH DIARRHEA: Primary | Chronic | ICD-10-CM

## 2024-01-22 PROCEDURE — 99214 OFFICE O/P EST MOD 30 MIN: CPT | Performed by: NURSE PRACTITIONER

## 2024-01-22 RX ORDER — ACYCLOVIR 400 MG/1
TABLET ORAL
COMMUNITY
Start: 2024-01-16

## 2024-01-22 RX ORDER — AMLODIPINE BESYLATE 10 MG/1
1 TABLET ORAL DAILY
COMMUNITY
Start: 2023-12-09

## 2024-01-22 RX ORDER — DICYCLOMINE HCL 20 MG
20 TABLET ORAL 3 TIMES DAILY PRN
Qty: 45 TABLET | Refills: 2 | Status: SHIPPED | OUTPATIENT
Start: 2024-01-22

## 2024-01-22 NOTE — PATIENT INSTRUCTIONS
Antireflux measures: Avoid fried, fatty foods, alcohol, chocolate, coffee, tea,  soft drinks, peppermint and spearmint, spicy foods, tomatoes and tomato based foods, onions, peppers, and smoking.   Other antireflux measures include weight reduction if overweight, avoiding tight clothing around the abdomen, elevating the head of the bed 6 inches with blocks under the head board, and don't drink or eat before going to bed and avoid lying down immediately after meals.  Avoid vaping/smoking.  Esomeprazole 20 mg 1 by mouth in the am 30 minutes before breakfast.  High fiber, low fat diet with liberal water intake.   Bentyl 20 mg 1 po 3 times per day as needed for lower abdominal pain.  Imodium 2 mg 1/2-1 caplet 1-2 times per day as needed for >3 episodes of diarrhea.   Low FODMAP diet - Avoid dairy. May use lactose free/dairy free alternatives such as almond milk, oat milk, etc.  Labs  Stool studies  CT Abdomen and pelvis  Colonoscopy for surveillance in 5 years, October 2027.  Follow up: 3-4 months     Low-FODMAP Eating Plan  FODMAP stands for fermentable oligosaccharides, disaccharides, monosaccharides, and polyols. These are sugars that are hard for some people to digest. A low-FODMAP eating plan may help some people who have irritable bowel syndrome (IBS) and certain other bowel (intestinal) diseases to manage their symptoms.  This meal plan can be complicated to follow. Work with a diet and nutrition specialist (dietitian) to make a low-FODMAP eating plan that is right for you. A dietitian can help make sure that you get enough nutrition from this diet.  What are tips for following this plan?  Reading food labels  Check labels for hidden FODMAPs such as:  High-fructose syrup.  Honey.  Agave.  Natural fruit flavors.  Onion or garlic powder.  Choose low-FODMAP foods that contain 3-4 grams of fiber per serving.  Check food labels for serving sizes. Eat only one serving at a time to make sure FODMAP levels stay  low.  Shopping  Shop with a list of foods that are recommended on this diet and make a meal plan.  Meal planning  Follow a low-FODMAP eating plan for up to 6 weeks, or as told by your health care provider or dietitian.  To follow the eating plan:  Eliminate high-FODMAP foods from your diet completely. Choose only low-FODMAP foods to eat. You will do this for 2-6 weeks.  Gradually reintroduce high-FODMAP foods into your diet one at a time. Most people should wait a few days before introducing the next new high-FODMAP food into their meal plan. Your dietitian can recommend how quickly you may reintroduce foods.  Keep a daily record of what and how much you eat and drink. Make note of any symptoms that you have after eating.  Review your daily record with a dietitian regularly to identify which foods you can eat and which foods you should avoid.  General tips  Drink enough fluid each day to keep your urine pale yellow.  Avoid processed foods. These often have added sugar and may be high in FODMAPs.  Avoid most dairy products, whole grains, and sweeteners.  Work with a dietitian to make sure you get enough fiber in your diet.  Avoid high FODMAP foods at meals to manage symptoms.     Recommended foods  Fruits  Bananas, oranges, tangerines, kelly, limes, blueberries, raspberries, strawberries, grapes, cantaloupe, honeydew melon, kiwi, papaya, passion fruit, and pineapple. Limited amounts of dried cranberries, banana chips, and shredded coconut.  Vegetables  Eggplant, zucchini, cucumber, peppers, green beans, bean sprouts, lettuce, arugula, kale, Swiss chard, spinach, mahesh greens, bok estephania, summer squash, potato, and tomato. Limited amounts of corn, carrot, and sweet potato. Green parts of scallions.  Grains  Gluten-free grains, such as rice, oats, buckwheat, quinoa, corn, polenta, and millet. Gluten-free pasta, bread, or cereal. Rice noodles. Corn tortillas.  Meats and other proteins  Unseasoned beef, pork, poultry,  "or fish. Eggs. Springer. Tofu (firm) and tempeh. Limited amounts of nuts and seeds, such as almonds, walnuts, brazil nuts, pecans, peanuts, nut butters, pumpkin seeds, efren seeds, and sunflower seeds.  Dairy  Lactose-free milk, yogurt, and kefir. Lactose-free cottage cheese and ice cream. Non-dairy milks, such as almond, coconut, hemp, and rice milk. Non-dairy yogurt. Limited amounts of goat cheese, brie, mozzarella, parmesan, swiss, and other hard cheeses.  Fats and oils  Butter-free spreads. Vegetable oils, such as olive, canola, and sunflower oil.  Seasoning and other foods  Artificial sweeteners with names that do not end in \"ol,\" such as aspartame, saccharine, and stevia. Maple syrup, white table sugar, raw sugar, brown sugar, and molasses. Mayonnaise, soy sauce, and tamari. Fresh basil, coriander, parsley, rosemary, and thyme.  Beverages  Water and mineral water. Sugar-sweetened soft drinks. Small amounts of orange juice or cranberry juice. Black and green tea. Most dry arlene. Coffee.  The items listed above may not be a complete list of foods and beverages you can eat. Contact a dietitian for more information.     Foods to avoid  Fruits  Fresh, dried, and juiced forms of apple, pear, watermelon, peach, plum, cherries, apricots, blackberries, boysenberries, figs, nectarines, and liliana. Avocado.  Vegetables  Chicory root, artichoke, asparagus, cabbage, snow peas, [place] sprouts, broccoli, sugar snap peas, mushrooms, celery, and cauliflower. Onions, garlic, leeks, and the white part of scallions.  Grains  Wheat, including kamut, durum, and semolina. Barley and bulgur. Couscous. Wheat-based cereals. Wheat noodles, bread, crackers, and pastries.  Meats and other proteins  Fried or fatty meat. Sausage. Cashews and pistachios. Soybeans, baked beans, black beans, chickpeas, kidney beans, anthony beans, navy beans, lentils, black-eyed peas, and split peas.  Dairy  Milk, yogurt, ice cream, and soft cheese. Cream and sour " cream. Milk-based sauces. Custard. Buttermilk. Soy milk.  Seasoning and other foods  Any sugar-free gum or candy. Foods that contain artificial sweeteners such as sorbitol, mannitol, isomalt, or xylitol. Foods that contain honey, high-fructose corn syrup, or agave. Bouillon, vegetable stock, beef stock, and chicken stock. Garlic and onion powder. Condiments made with onion, such as hummus, chutney, pickles, relish, salad dressing, and salsa. Tomato paste.  Beverages  Chicory-based drinks. Coffee substitutes. Chamomile tea. Fennel tea. Sweet or fortified arlene such as port or vito. Diet soft drinks made with isomalt, mannitol, maltitol, sorbitol, or xylitol. Apple, pear, and liliana juice. Juices with high-fructose corn syrup.  The items listed above may not be a complete list of foods and beverages you should avoid. Contact a dietitian for more information.  Summary  FODMAP stands for fermentable oligosaccharides, disaccharides, monosaccharides, and polyols. These are sugars that are hard for some people to digest.  A low-FODMAP eating plan is a short-term diet that helps to ease symptoms of certain bowel diseases.  The eating plan usually lasts up to 6 weeks. After that, high-FODMAP foods are reintroduced gradually and one at a time. This can help you find out which foods may be causing symptoms.  A low-FODMAP eating plan can be complicated. It is best to work with a dietitian who has experience with this type of plan.  This information is not intended to replace advice given to you by your health care provider. Make sure you discuss any questions you have with your health care provider.  Document Revised: 05/06/2021 Document Reviewed: 05/06/2021  [employer]vier Patient Education © 2022 [employer]

## 2024-01-22 NOTE — PROGRESS NOTES
Follow Up Note     Date: 2024   Patient Name: Robinson Tovar  MRN: [id number]  : 1965     Primary Care Provider: Eleonora Garcia APRN     Chief Complaint   Patient presents with    Irritable Bowel Syndrome     History of present illness:   2024  Robinson Tovar is a 58 y.o. male who is here today for follow up for IBS. He has been having worsening of diarrhea. He was started on blood pressure pills and diarrhea worsened. He has diarrhea a couple of days a week, 2-3 episodes on those days. If he eats spicy foods diarrhea is worse.  He has some abdominal cramping with the diarrhea. No nausea or vomiting. Reflux reasonably controlled with PPI. Denies any GI bleeding.     Interval History:  2023  Robinson Tovar is a 57 y.o. male who is here today for follow up after colonoscopy. He needs a refill of his Nexium. Abdominal cramping and diarrhea is much better, he rarely has any problems. Denies GI bleeding. No weight loss. No problem with early satiety right now.      3/16/2021  He has a chronic episodic diarrhea for about 15-20 yrs. He normally gets one soft bowel movement but intermittently gets episodes of diarhea. He gets these episodes once in a week or so, last for 3-4 days and clears off. Associated abdominal rumbling. He feels like full all day without any hunger. He will get lot of gas and benching.      This patient deny any abdominal pain, no recent change in bowel habit, hematochezia or melena.  Weight is stable. Pt denies nausea, vomiting or odynophagia or dysphagia. There is  history of occasional acid reflux. There is no history of anemia. No prior recent history of EGD or colonoscopy. Family history of colon cancer- Dad at the age of 70. No history of any abdominal surgery. Denies alcohol abuse or cigarette smoking. He quit chewing tobacco.      He has a diabetes mellitus with noncompliance with medication.  He is currently on insulin.     Subjective      Past Medical  History:   Diagnosis Date    Diabetes mellitus     Disease of thyroid gland     Early satiety     Elevated cholesterol     Gastroparesis     GERD (gastroesophageal reflux disease)     Heart murmur     as a child    Hyperlipidemia     Hypertension      Past Surgical History:   Procedure Laterality Date    COLONOSCOPY      COLONOSCOPY N/A 4/23/2021    Procedure: COLONOSCOPY WITH BIOPSIES, AND RESOLUTION CLIPS;  Surgeon: Cheko Rogel MD;  Location: [facility] ENDOSCOPY;  Service: Gastroenterology;  Laterality: N/A;    COLONOSCOPY N/A 10/17/2022    Procedure: COLONOSCOPY with polypectomy ;  Surgeon: Cheko Rogel MD;  Location: [facility] ENDOSCOPY;  Service: Gastroenterology;  Laterality: N/A;    ENDOSCOPY N/A 4/23/2021    Procedure: ESOPHAGOGASTRODUODENOSCOPY WITH BIOPSIES;  Surgeon: Cheko Rogel MD;  Location: [facility] ENDOSCOPY;  Service: Gastroenterology;  Laterality: N/A;    EYE SURGERY Right 2017    artificial lens placed    KIDNEY STONE SURGERY       Family History   Problem Relation Age of Onset    Diabetes Father     Colon cancer Father 74    Cirrhosis Neg Hx     Liver disease Neg Hx     Liver cancer Neg Hx      Social History     Socioeconomic History    Marital status: Unknown   Tobacco Use    Smoking status: Never    Smokeless tobacco: Former     Types: Chew     Quit date: 4/22/2020   Vaping Use    Vaping Use: Never used   Substance and Sexual Activity    Alcohol use: Not Currently    Drug use: Never    Sexual activity: Defer       Current Outpatient Medications:     amLODIPine (NORVASC) 10 MG tablet, Take 1 tablet by mouth Daily., Disp: , Rfl:     Continuous Blood Gluc Sensor (Dexcom G7 Sensor) misc, CHANGE every 10 DAYS AS DIRECTED, Disp: , Rfl:     dicyclomine (BENTYL) 20 MG tablet, Take 1 tablet by mouth 3 (Three) Times a Day As Needed for Abdominal Cramping., Disp: 45 tablet, Rfl: 2    ergocalciferol (ERGOCALCIFEROL) 1.25 MG (56295 UT) capsule, Take 1 capsule by mouth 1 (One) Time  Per Week., Disp: , Rfl:     esomeprazole (nexIUM) 20 MG capsule, Take 1 capsule by mouth Every Morning Before Breakfast., Disp: 90 capsule, Rfl: 3    insulin aspart (novoLOG FLEXPEN) 100 UNIT/ML solution pen-injector sc pen, Inject  under the skin into the appropriate area as directed 3 (Three) Times a Day With Meals. 10-15 units sc with Meals (depends on blood sugar per pt report).   Per pharmacy @ [facility]:  Max dose 70 units TID with meals., Disp: , Rfl:     insulin detemir (LEVEMIR) 100 UNIT/ML injection, Inject 80 Units under the skin into the appropriate area as directed Every Night., Disp: , Rfl:     Allergies   Allergen Reactions    Sulfa Antibiotics Anaphylaxis    Apidra [Insulin Glulisine] Nausea And Vomiting    Basaglar Kwikpen [Insulin Glargine] Nausea And Vomiting     Nausea, vomiting, diarrhea    Humalog [Insulin Lispro] GI Intolerance    Tresiba [Insulin Degludec] Diarrhea     Diarrhea and stomach cramps    Xultophy [Insulin Degludec-Liraglutide] Diarrhea     Diarrhea and stomach cramps    Penicillins Other (See Comments)     Pt unsure of reaction       The following portions of the patient's history were reviewed and updated as appropriate: allergies, current medications, past family history, past medical history, past social history, past surgical history and problem list.  Objective     Physical Exam  Vitals and nursing note reviewed.   Constitutional:       General: He is not in acute distress.     Appearance: Normal appearance. He is well-developed.   HENT:      Head: Normocephalic and atraumatic.      Mouth/Throat:      Mouth: Mucous membranes are not pale, not dry and not cyanotic.   Eyes:      General: Lids are normal.   Neck:      Trachea: Trachea normal.   Cardiovascular:      Rate and Rhythm: Normal rate.   Pulmonary:      Effort: Pulmonary effort is normal. No respiratory distress.      Breath sounds: Normal breath sounds.   Abdominal:      General: Bowel sounds are normal.       "Palpations: Abdomen is soft.      Tenderness: There is no abdominal tenderness.   Skin:     General: Skin is warm and dry.   Neurological:      Mental Status: He is alert and oriented to person, place, and time.   Psychiatric:         Mood and Affect: Mood normal.         Speech: Speech normal.         Behavior: Behavior normal. Behavior is cooperative.       Vitals:    24 1533   BP: 122/80   Pulse: 65   Resp: 14   SpO2: 95%   Weight: 89.4 kg (197 lb)   Height: 157.5 cm (62\")     Body mass index is 36.03 kg/m².     Results Review:   I reviewed the patient's new clinical results.    No visits with results within 90 Day(s) from this visit.   Latest known visit with results is:   Admission on 10/17/2022, Discharged on 10/17/2022   Component Date Value Ref Range Status    Reference Lab Report 10/17/2022    Final                    Value:[facility]  [address] [place] [address]  Phone: [phone] or [phone]  Fax: [phone]  Ramon Velarde M.D., Medical Director    PATIENT NAME                           LABORATORY [id number]  EHSAN BULLOCK                      [id number]  [id number]                         AGE              SEX  SSN           CLIENT REF #  [facility]            57      1965      [id number]   [id number]    [address]                REQUESTING M.D.     ATTENDING M.D.     COPY TO.   [address]                        MICHELL MOSQUEDA [place] [id number]                 [facility]  DATE COLLECTED      DATE RECEIVED      DATE REPORTED  10/17/2022          10/17/2022         10/18/2022    DIAGNOSIS:  A.   SIGMOID COLON POLYP:  Tubular adenoma; negative for high-grade dysplasia.  B.   RECTAL POLYP:  Hyperplastic polyp.    WJB                   REVIEWED, DIAGNOSED AND ELECTRONICALLY  SIGNED BY:    Galileo Blancas D.O.  CPT CODES:  88305x2        EGD and colonoscopy were completed by Dr. Mosqueda on " 4/23/2021:  - The oropharynx was normal.  - The Z-line was regular and was found 36 cm from the incisors.  - No gross lesions were noted in the entire esophagus.  - Patchy mildly erythematous mucosa without bleeding was found on the posterior wall of the stomach and in the gastric antrum. Biopsies were taken with a cold forceps for Helicobacter pylori testing. Biopsies were taken with a cold forceps for Helicobacter pylori testing.  - The duodenal bulb, first portion of the duodenum, second portion of the duodenum and third portion of the duodenum were normal. Biopsies for histology were taken with a cold forceps for evaluation of celiac disease.  - The perianal and digital rectal examinations were normal.  - A 5 mm polyp was found in the proximal descending colon. The polyp was sessile. The polyp was removed with a cold snare. Resection and retrieval were complete.  - A 15 to 16 mm polyp was found in the rectum. The polyp was flat and Lyla classification IIb (flat). Preparations were made for mucosal resection. 5 mL of orise was injected with adequate lift of the lesion from the muscularis propria.  Snare mucosal resection with suction (via the working channel) retrieval was performed. A 15 x 20 mm area was resected. Resection and retrieval were complete. There was no bleeding during the procedure. To close a defect after polypectomy, three hemostatic clips were successfully placed (MR conditional). There was no bleeding at the end of the procedure. Area was tattooed with an injection of 4 mL of Tanya ink.  - A few small-mouthed diverticula were found in the sigmoid colon and ascending colon.  - Biopsies for histology were taken with a cold forceps from the right colon, left colon and transverse colon for evaluation of microscopic colitis.  - The terminal ileum appeared normal. Biopsies were taken with a cold forceps for histology for diarrhea.   - Pathology showed unremarkable duodenal mucosa with preserved  villous architecture, reactive gastropathy, negative H. pylori, negative for intestinal metaplasia of the antral biopsy, random colon biopsies were unremarkable, descending colon polyp was tubular adenoma negative for high-grade dysplasia, rectal polyp was tubular adenoma.     Colonoscopy dated 10/17/2022 per Dr. Rogel  - One 6 mm polyp in the distal sigmoid colon, removed with a cold snare. Resected and retrieved.  - A tattoo was seen in the distal rectum. The tattoo site appeared normal.  - Two small 3- 4 mm polypoid area of mucosa at prior EMR site edge was found in the distal rectum removed with a cold snare. Resected and retrieved. Clinically hyperplastic.  - Diverticulosis in the sigmoid colon.  - The examined portion of the ileum was normal.  A.   SIGMOID COLON POLYP:   Tubular adenoma; negative for high-grade dysplasia.   B.   RECTAL POLYP:   Hyperplastic polyp.    Assessment / Plan      1. Irritable bowel syndrome with diarrhea  2. Diarrhea, unspecified type  3. Lower abdominal pain  He has a history of lower abdominal pain and diarrhea for over 15 years that comes and goes.  He was started on a new blood pressure medication and diarrhea worsened.  Spicy foods make diarrhea worse.  Denies any GI bleeding.  He is not taking anything for the cramping and the diarrhea at this time. Colonoscopy and EGD 4/23/2021 with colon polyps removed, biopsies unremarkable.  EGD unremarkable with duodenal biopsies with preserved villous architecture, no evidence of celiac. Likely IBS-D.   Labs  Stool studies  CTAP to rule out solid organ pathology.  Bentyl and Imodium as needed.  Trial of Xifaxan if labs and stool studies unremarkable.    - dicyclomine (BENTYL) 20 MG tablet; Take 1 tablet by mouth 3 (Three) Times a Day As Needed for Abdominal Cramping.  Dispense: 45 tablet; Refill: 2  - CBC (No Diff); Future  - Comprehensive Metabolic Panel; Future  - TSH; Future  - IgA; Future  - Tissue Transglutaminase, IgA; Future  -  Calprotectin, Fecal - Stool, Per Rectum; Future  - Pancreatic Elastase, Fecal - Stool, Per Rectum; Future  - CT Abdomen Pelvis With Contrast; Future    4. Gastroesophageal reflux disease without esophagitis  Reflux reasonably controlled with 20 mg daily.  Continue same.  No difficulty swallowing.  EGD dated 4/23/2021 unremarkable, no Hair's.  Antireflux measures.    - esomeprazole (nexIUM) 20 MG capsule; Take 1 capsule by mouth Every Morning Before Breakfast.  Dispense: 90 capsule; Refill: 3    Prior History:  Early satiety  He has not had any problems with early satiety at this time. No nausea or vomiting. Denies GI bleeding. Colonoscopy and EGD unremarkable. Diabetes can be causing/contritubing to his symptoms.  Will monitor for now as he is not symptomatic.  CTAP in the future if symptoms return.      Adenomatous polyp of sigmoid colon  Colonoscopy in 2021 with 15 to 16 mm rectal polyp removed, tubular adenoma without dysplasia. Follow up colonoscopy dated 10/17/2022 with 2 polyps removed, one tubular adenoma without dysplasia in sigmoid colon, one polyp removed from prior EMR site-hyperplastic.  There is a family history of colon cancer in his father diagnosed around age 74.  Colonoscopy for surveillance in 5 years, 2027, as polyp from prior EMR site hyperplastic.    Patient Instructions   Antireflux measures: Avoid fried, fatty foods, alcohol, chocolate, coffee, tea,  soft drinks, peppermint and spearmint, spicy foods, tomatoes and tomato based foods, onions, peppers, and smoking.   Other antireflux measures include weight reduction if overweight, avoiding tight clothing around the abdomen, elevating the head of the bed 6 inches with blocks under the head board, and don't drink or eat before going to bed and avoid lying down immediately after meals.  Avoid vaping/smoking.  Esomeprazole 20 mg 1 by mouth in the am 30 minutes before breakfast.  High fiber, low fat diet with liberal water intake.   Bentyl 20 mg  1 po 3 times per day as needed for lower abdominal pain.  Imodium 2 mg 1/2-1 caplet 1-2 times per day as needed for >3 episodes of diarrhea.   Low FODMAP diet - Avoid dairy. May use lactose free/dairy free alternatives such as almond milk, oat milk, etc.  Labs  Stool studies  CT Abdomen and pelvis  Colonoscopy for surveillance in 5 years, October 2027.  Follow up: 3-4 months     Low-FODMAP Eating Plan  FODMAP stands for fermentable oligosaccharides, disaccharides, monosaccharides, and polyols. These are sugars that are hard for some people to digest. A low-FODMAP eating plan may help some people who have irritable bowel syndrome (IBS) and certain other bowel (intestinal) diseases to manage their symptoms.  This meal plan can be complicated to follow. Work with a diet and nutrition specialist (dietitian) to make a low-FODMAP eating plan that is right for you. A dietitian can help make sure that you get enough nutrition from this diet.  What are tips for following this plan?  Reading food labels  Check labels for hidden FODMAPs such as:  High-fructose syrup.  Honey.  Agave.  Natural fruit flavors.  Onion or garlic powder.  Choose low-FODMAP foods that contain 3-4 grams of fiber per serving.  Check food labels for serving sizes. Eat only one serving at a time to make sure FODMAP levels stay low.  Shopping  Shop with a list of foods that are recommended on this diet and make a meal plan.  Meal planning  Follow a low-FODMAP eating plan for up to 6 weeks, or as told by your health care provider or dietitian.  To follow the eating plan:  Eliminate high-FODMAP foods from your diet completely. Choose only low-FODMAP foods to eat. You will do this for 2-6 weeks.  Gradually reintroduce high-FODMAP foods into your diet one at a time. Most people should wait a few days before introducing the next new high-FODMAP food into their meal plan. Your dietitian can recommend how quickly you may reintroduce foods.  Keep a daily record of  "what and how much you eat and drink. Make note of any symptoms that you have after eating.  Review your daily record with a dietitian regularly to identify which foods you can eat and which foods you should avoid.  General tips  Drink enough fluid each day to keep your urine pale yellow.  Avoid processed foods. These often have added sugar and may be high in FODMAPs.  Avoid most dairy products, whole grains, and sweeteners.  Work with a dietitian to make sure you get enough fiber in your diet.  Avoid high FODMAP foods at meals to manage symptoms.     Recommended foods  Fruits  Bananas, oranges, tangerines, kelly, limes, blueberries, raspberries, strawberries, grapes, cantaloupe, honeydew melon, kiwi, papaya, passion fruit, and pineapple. Limited amounts of dried cranberries, banana chips, and shredded coconut.  Vegetables  Eggplant, zucchini, cucumber, peppers, green beans, bean sprouts, lettuce, arugula, kale, Swiss chard, spinach, mahesh greens, bok estephania, summer squash, potato, and tomato. Limited amounts of corn, carrot, and sweet potato. Green parts of scallions.  Grains  Gluten-free grains, such as rice, oats, buckwheat, quinoa, corn, polenta, and millet. Gluten-free pasta, bread, or cereal. Rice noodles. Corn tortillas.  Meats and other proteins  Unseasoned beef, pork, poultry, or fish. Eggs. Springer. Tofu (firm) and tempeh. Limited amounts of nuts and seeds, such as almonds, walnuts, brazil nuts, pecans, peanuts, nut butters, pumpkin seeds, efren seeds, and sunflower seeds.  Dairy  Lactose-free milk, yogurt, and kefir. Lactose-free cottage cheese and ice cream. Non-dairy milks, such as almond, coconut, hemp, and rice milk. Non-dairy yogurt. Limited amounts of goat cheese, brie, mozzarella, parmesan, swiss, and other hard cheeses.  Fats and oils  Butter-free spreads. Vegetable oils, such as olive, canola, and sunflower oil.  Seasoning and other foods  Artificial sweeteners with names that do not end in \"ol,\" " such as aspartame, saccharine, and stevia. Maple syrup, white table sugar, raw sugar, brown sugar, and molasses. Mayonnaise, soy sauce, and tamari. Fresh basil, coriander, parsley, rosemary, and thyme.  Beverages  Water and mineral water. Sugar-sweetened soft drinks. Small amounts of orange juice or cranberry juice. Black and green tea. Most dry arlene. Coffee.  The items listed above may not be a complete list of foods and beverages you can eat. Contact a dietitian for more information.     Foods to avoid  Fruits  Fresh, dried, and juiced forms of apple, pear, watermelon, peach, plum, cherries, apricots, blackberries, boysenberries, figs, nectarines, and liliana. Avocado.  Vegetables  Chicory root, artichoke, asparagus, cabbage, snow peas, [place] sprouts, broccoli, sugar snap peas, mushrooms, celery, and cauliflower. Onions, garlic, leeks, and the white part of scallions.  Grains  Wheat, including kamut, durum, and semolina. Barley and bulgur. Couscous. Wheat-based cereals. Wheat noodles, bread, crackers, and pastries.  Meats and other proteins  Fried or fatty meat. Sausage. Cashews and pistachios. Soybeans, baked beans, black beans, chickpeas, kidney beans, anthony beans, navy beans, lentils, black-eyed peas, and split peas.  Dairy  Milk, yogurt, ice cream, and soft cheese. Cream and sour cream. Milk-based sauces. Custard. Buttermilk. Soy milk.  Seasoning and other foods  Any sugar-free gum or candy. Foods that contain artificial sweeteners such as sorbitol, mannitol, isomalt, or xylitol. Foods that contain honey, high-fructose corn syrup, or agave. Bouillon, vegetable stock, beef stock, and chicken stock. Garlic and onion powder. Condiments made with onion, such as hummus, chutney, pickles, relish, salad dressing, and salsa. Tomato paste.  Beverages  Chicory-based drinks. Coffee substitutes. Chamomile tea. Fennel tea. Sweet or fortified arlene such as port or vito. Diet soft drinks made with isomalt, mannitol,  maltitol, sorbitol, or xylitol. Apple, pear, and liliana juice. Juices with high-fructose corn syrup.  The items listed above may not be a complete list of foods and beverages you should avoid. Contact a dietitian for more information.  Summary  FODMAP stands for fermentable oligosaccharides, disaccharides, monosaccharides, and polyols. These are sugars that are hard for some people to digest.  A low-FODMAP eating plan is a short-term diet that helps to ease symptoms of certain bowel diseases.  The eating plan usually lasts up to 6 weeks. After that, high-FODMAP foods are reintroduced gradually and one at a time. This can help you find out which foods may be causing symptoms.  A low-FODMAP eating plan can be complicated. It is best to work with a dietitian who has experience with this type of plan.  This information is not intended to replace advice given to you by your health care provider. Make sure you discuss any questions you have with your health care provider.  Document Revised: 05/06/2021 Document Reviewed: 05/06/2021  [employer] Patient Education © 2022 [employer]     Trinity Rogers, APRN  1/22/2024    Please note that portions of this note were completed with a voice recognition program.

## 2024-02-05 ENCOUNTER — TELEPHONE (OUTPATIENT)
Dept: GASTROENTEROLOGY | Facility: CLINIC | Age: 59
End: 2024-02-05
Payer: COMMERCIAL

## 2024-02-05 DIAGNOSIS — K86.89 PANCREATIC INSUFFICIENCY: ICD-10-CM

## 2024-02-05 DIAGNOSIS — K58.0 IRRITABLE BOWEL SYNDROME WITH DIARRHEA: Primary | ICD-10-CM

## 2024-02-05 RX ORDER — PANCRELIPASE 36000; 180000; 114000 [USP'U]/1; [USP'U]/1; [USP'U]/1
CAPSULE, DELAYED RELEASE PELLETS ORAL
Qty: 240 CAPSULE | Refills: 3 | Status: SHIPPED | OUTPATIENT
Start: 2024-02-05

## 2024-02-05 NOTE — TELEPHONE ENCOUNTER
----- Message from Alvarado Iverson sent at 2/2/2024  9:00 AM EST -----  Regarding: Labs  Labs received and scanned to chart.

## 2024-02-05 NOTE — TELEPHONE ENCOUNTER
Let the patient know his pancreatic enzymes are low. I have sent in Creon for him to take 2 capsules with every meal and 1 capsule with every snack. He can also continue the Bentyl and Imodium as needed.    Yes

## 2024-02-07 DIAGNOSIS — K21.9 GASTROESOPHAGEAL REFLUX DISEASE WITHOUT ESOPHAGITIS: Chronic | ICD-10-CM

## 2024-02-07 NOTE — TELEPHONE ENCOUNTER
Called patient, he could not take the number for Radhika, but I have told the patient to call the hospital and ask the  to speak with radiology scheduling.  He is to call us, if there is any issue.

## 2024-07-11 ENCOUNTER — OFFICE VISIT (OUTPATIENT)
Dept: GASTROENTEROLOGY | Facility: CLINIC | Age: 59
End: 2024-07-11
Payer: COMMERCIAL

## 2024-07-11 VITALS
OXYGEN SATURATION: 98 % | SYSTOLIC BLOOD PRESSURE: 138 MMHG | HEART RATE: 90 BPM | RESPIRATION RATE: 14 BRPM | WEIGHT: 197 LBS | BODY MASS INDEX: 36.25 KG/M2 | DIASTOLIC BLOOD PRESSURE: 86 MMHG | HEIGHT: 62 IN

## 2024-07-11 DIAGNOSIS — K58.0 IRRITABLE BOWEL SYNDROME WITH DIARRHEA: Primary | Chronic | ICD-10-CM

## 2024-07-11 DIAGNOSIS — R19.7 DIARRHEA, UNSPECIFIED TYPE: Chronic | ICD-10-CM

## 2024-07-11 DIAGNOSIS — R10.30 LOWER ABDOMINAL PAIN: Chronic | ICD-10-CM

## 2024-07-11 DIAGNOSIS — K86.89 PANCREATIC INSUFFICIENCY: Chronic | ICD-10-CM

## 2024-07-11 PROCEDURE — 99214 OFFICE O/P EST MOD 30 MIN: CPT | Performed by: NURSE PRACTITIONER

## 2024-07-11 RX ORDER — METOPROLOL SUCCINATE 100 MG/1
1 TABLET, EXTENDED RELEASE ORAL DAILY
COMMUNITY
Start: 2024-07-02

## 2024-07-11 NOTE — PROGRESS NOTES
Follow Up Note     Date: 2024   Patient Name: Robinson Tovar  MRN: [id number]  : 1965     Primary Care Provider: Eleonora Garcia APRN     Chief Complaint   Patient presents with    Irritable Bowel Syndrome     History of present illness:   2024  Robinson Tovar is a 59 y.o. male who is here today for follow up for diarrhea. Diarrhea is unchanged. He tried taking Creon but it didn't help. He is not able to take it as directed due to his work schedule, he only took it when he remembered to take it.  He had the CT scan at [facility], but unfortunately we did not get those results. No GI bleeding.     Interval History:  2024  Robinson Tovar is a 58 y.o. male who is here today for follow up for IBS. He has been having worsening of diarrhea. He was started on blood pressure pills and diarrhea worsened. He has diarrhea a couple of days a week, 2-3 episodes on those days. If he eats spicy foods diarrhea is worse.  He has some abdominal cramping with the diarrhea. No nausea or vomiting. Reflux reasonably controlled with PPI. Denies any GI bleeding.      3/16/2021  He has a chronic episodic diarrhea for about 15-20 yrs. He normally gets one soft bowel movement but intermittently gets episodes of diarhea. He gets these episodes once in a week or so, last for 3-4 days and clears off. Associated abdominal rumbling. He feels like full all day without any hunger. He will get lot of gas and benching.      This patient deny any abdominal pain, no recent change in bowel habit, hematochezia or melena.  Weight is stable. Pt denies nausea, vomiting or odynophagia or dysphagia. There is  history of occasional acid reflux. There is no history of anemia. No prior recent history of EGD or colonoscopy. Family history of colon cancer- Dad at the age of 70. No history of any abdominal surgery. Denies alcohol abuse or cigarette smoking. He quit chewing tobacco.      He has a diabetes mellitus with  noncompliance with medication.  He is currently on insulin.     Subjective      Past Medical History:   Diagnosis Date    Diabetes mellitus     Disease of thyroid gland     Early satiety     Elevated cholesterol     Gastroparesis     GERD (gastroesophageal reflux disease)     Heart murmur     as a child    Hyperlipidemia     Hypertension      Past Surgical History:   Procedure Laterality Date    COLONOSCOPY      COLONOSCOPY N/A 4/23/2021    Procedure: COLONOSCOPY WITH BIOPSIES, AND RESOLUTION CLIPS;  Surgeon: Cheko Rogel MD;  Location: [facility] ENDOSCOPY;  Service: Gastroenterology;  Laterality: N/A;    COLONOSCOPY N/A 10/17/2022    Procedure: COLONOSCOPY with polypectomy ;  Surgeon: Cheko Rogel MD;  Location: [facility] ENDOSCOPY;  Service: Gastroenterology;  Laterality: N/A;    ENDOSCOPY N/A 4/23/2021    Procedure: ESOPHAGOGASTRODUODENOSCOPY WITH BIOPSIES;  Surgeon: Cheko Rogel MD;  Location: [facility] ENDOSCOPY;  Service: Gastroenterology;  Laterality: N/A;    EYE SURGERY Right 2017    artificial lens placed    KIDNEY STONE SURGERY       Family History   Problem Relation Age of Onset    Diabetes Father     Colon cancer Father 74    Cirrhosis Neg Hx     Liver disease Neg Hx     Liver cancer Neg Hx      Social History     Socioeconomic History    Marital status: Unknown   Tobacco Use    Smoking status: Never    Smokeless tobacco: Former     Types: Chew     Quit date: 4/22/2020   Vaping Use    Vaping status: Never Used   Substance and Sexual Activity    Alcohol use: Not Currently    Drug use: Never    Sexual activity: Defer       Current Outpatient Medications:     amLODIPine (NORVASC) 10 MG tablet, Take 1 tablet by mouth Daily., Disp: , Rfl:     Continuous Blood Gluc Sensor (Dexcom G7 Sensor) misc, CHANGE every 10 DAYS AS DIRECTED, Disp: , Rfl:     dicyclomine (BENTYL) 20 MG tablet, Take 1 tablet by mouth 3 (Three) Times a Day As Needed for Abdominal Cramping., Disp: 45 tablet, Rfl: 2     ergocalciferol (ERGOCALCIFEROL) 1.25 MG (28505 UT) capsule, Take 1 capsule by mouth 1 (One) Time Per Week., Disp: , Rfl:     esomeprazole (nexIUM) 20 MG capsule, Take 1 capsule by mouth Every Morning Before Breakfast., Disp: 90 capsule, Rfl: 3    insulin aspart (novoLOG FLEXPEN) 100 UNIT/ML solution pen-injector sc pen, Inject  under the skin into the appropriate area as directed 3 (Three) Times a Day With Meals. 10-15 units sc with Meals (depends on blood sugar per pt report).   Per pharmacy @ [facility]:  Max dose 70 units TID with meals., Disp: , Rfl:     insulin detemir (LEVEMIR) 100 UNIT/ML injection, Inject 80 Units under the skin into the appropriate area as directed Every Night., Disp: , Rfl:     metoprolol succinate XL (TOPROL-XL) 100 MG 24 hr tablet, Take 1 tablet by mouth Daily., Disp: , Rfl:     riFAXIMin (Xifaxan) 550 MG tablet, Take 1 tablet by mouth Every 8 (Eight) Hours for 14 days., Disp: 42 tablet, Rfl: 0    Allergies   Allergen Reactions    Sulfa Antibiotics Anaphylaxis    Apidra [Insulin Glulisine] Nausea And Vomiting    Basaglar Kwikpen [Insulin Glargine] Nausea And Vomiting     Nausea, vomiting, diarrhea    Humalog [Insulin Lispro] GI Intolerance    Tresiba [Insulin Degludec] Diarrhea     Diarrhea and stomach cramps    Xultophy [Insulin Degludec-Liraglutide] Diarrhea     Diarrhea and stomach cramps    Penicillins Other (See Comments)     Pt unsure of reaction       The following portions of the patient's history were reviewed and updated as appropriate: allergies, current medications, past family history, past medical history, past social history, past surgical history and problem list.  Objective     Physical Exam  Vitals and nursing note reviewed.   Constitutional:       General: He is not in acute distress.     Appearance: Normal appearance. He is well-developed.   HENT:      Head: Normocephalic and atraumatic.      Mouth/Throat:      Mouth: Mucous membranes are not pale, not dry  "and not cyanotic.   Eyes:      General: Lids are normal.   Neck:      Trachea: Trachea normal.   Cardiovascular:      Rate and Rhythm: Normal rate.   Pulmonary:      Effort: Pulmonary effort is normal. No respiratory distress.      Breath sounds: Normal breath sounds.   Abdominal:      Tenderness: There is no abdominal tenderness.   Skin:     General: Skin is warm and dry.   Neurological:      Mental Status: He is alert and oriented to person, place, and time.   Psychiatric:         Mood and Affect: Mood normal.         Speech: Speech normal.         Behavior: Behavior normal. Behavior is cooperative.       Vitals:    24 1534   BP: 138/86   Pulse: 90   Resp: 14   SpO2: 98%   Weight: 89.4 kg (197 lb)   Height: 157.5 cm (62\")     Body mass index is 36.03 kg/m².     Results Review:   I reviewed the patient's new clinical results.    No visits with results within 90 Day(s) from this visit.   Latest known visit with results is:   Admission on 10/17/2022, Discharged on 10/17/2022   Component Date Value Ref Range Status    Reference Lab Report 10/17/2022    Final                    Value:[facility]  [address] [place] [address]  Phone: [phone] or [phone]  Fax: [phone]  Ramon Velarde M.D., Medical Director    PATIENT NAME                           LABORATORY NO.  EHSAN MANCINI                      [id number]  [id number]                         AGE              SEX  SSN           CLIENT REF #  [facility]            57      1965      [id number]   [id number]    [address]                REQUESTING M.D.     ATTENDING MDONTRELL     COPY TO.  [address]                        [place] [address]                 [facility]  DATE COLLECTED      DATE RECEIVED      DATE REPORTED  10/17/2022          10/17/2022         10/18/2022    DIAGNOSIS:  A.   SIGMOID COLON POLYP:  Tubular adenoma; negative for " high-grade dysplasia.  B.   RECTAL POLYP:  Hyperplastic polyp.    WJB      REVIEWED, DIAGNOSED AND ELECTRONICALLY  SIGNED BY:    Galileo Blancas D.O.  CPT CODES:  88305x2        SCANNED - LABS (01/24/2024)     No radiology results for the last 90 days.     EGD and colonoscopy were completed by Dr. Rogel on 4/23/2021:  - The oropharynx was normal.  - The Z-line was regular and was found 36 cm from the incisors.  - No gross lesions were noted in the entire esophagus.  - Patchy mildly erythematous mucosa without bleeding was found on the posterior wall of the stomach and in the gastric antrum. Biopsies were taken with a cold forceps for Helicobacter pylori testing. Biopsies were taken with a cold forceps for Helicobacter pylori testing.  - The duodenal bulb, first portion of the duodenum, second portion of the duodenum and third portion of the duodenum were normal. Biopsies for histology were taken with a cold forceps for evaluation of celiac disease.  - The perianal and digital rectal examinations were normal.  - A 5 mm polyp was found in the proximal descending colon. The polyp was sessile. The polyp was removed with a cold snare. Resection and retrieval were complete.  - A 15 to 16 mm polyp was found in the rectum. The polyp was flat and Lyla classification IIb (flat). Preparations were made for mucosal resection. 5 mL of orise was injected with adequate lift of the lesion from the muscularis propria.  Snare mucosal resection with suction (via the working channel) retrieval was performed. A 15 x 20 mm area was resected. Resection and retrieval were complete. There was no bleeding during the procedure. To close a defect after polypectomy, three hemostatic clips were successfully placed (MR conditional). There was no bleeding at the end of the procedure. Area was tattooed with an injection of 4 mL of Tanya ink.  - A few small-mouthed diverticula were found in the sigmoid colon and ascending colon.  -  Biopsies for histology were taken with a cold forceps from the right colon, left colon and transverse colon for evaluation of microscopic colitis.  - The terminal ileum appeared normal. Biopsies were taken with a cold forceps for histology for diarrhea.   - Pathology showed unremarkable duodenal mucosa with preserved villous architecture, reactive gastropathy, negative H. pylori, negative for intestinal metaplasia of the antral biopsy, random colon biopsies were unremarkable, descending colon polyp was tubular adenoma negative for high-grade dysplasia, rectal polyp was tubular adenoma.     Colonoscopy dated 10/17/2022 per Dr. Rogel  - One 6 mm polyp in the distal sigmoid colon, removed with a cold snare. Resected and retrieved.  - A tattoo was seen in the distal rectum. The tattoo site appeared normal.  - Two small 3- 4 mm polypoid area of mucosa at prior EMR site edge was found in the distal rectum removed with a cold snare. Resected and retrieved. Clinically hyperplastic.  - Diverticulosis in the sigmoid colon.  - The examined portion of the ileum was normal.  A.   SIGMOID COLON POLYP:   Tubular adenoma; negative for high-grade dysplasia.   B.   RECTAL POLYP:   Hyperplastic polyp.    Assessment / Plan      1. Irritable bowel syndrome with diarrhea  2. Pancreatic insufficiency  3. Diarrhea, unspecified type  4. Lower abdominal pain  Diarrhea and change.  He is not taking anything for his symptoms at this time.  He tried taking the Creon but only took it when he remembered, so it did not help.  No GI bleeding.  He had the CT scan ordered at last visit at [facility], but unfortunately, they did not send us the results.  Basic labs unremarkable.  TSH normal.  Fecal calprotectin normal.  Pancreatic elastase decreased at 90. Colonoscopy and EGD 4/23/2021 with colon polyps removed, biopsies unremarkable. duodenal biopsies with preserved villous architecture, no evidence of celiac.  Repeat colonoscopy  10/17/2022 with polyp removed, otherwise unremarkable.  Likely IBS-D.   Low FODMAP diet-avoid all dairy.  Metamucil or fiber Gummies daily.  Bentyl and Imodium as needed.  Xifaxan 1 p.o. 3 times a day x 14 days.  Will call [facility] to obtain copies of CT scan.    - riFAXIMin (Xifaxan) 550 MG tablet; Take 1 tablet by mouth Every 8 (Eight) Hours for 14 days.  Dispense: 42 tablet; Refill: 0    Prior History:  Gastroesophageal reflux disease without esophagitis  Reflux reasonably controlled with 20 mg daily.  Continue same.  No difficulty swallowing.  EGD dated 4/23/2021 unremarkable, no Hair's.  Antireflux measures.    Adenomatous polyp of sigmoid colon  Colonoscopy in 2021 with 15 to 16 mm rectal polyp removed, tubular adenoma without dysplasia. Follow up colonoscopy dated 10/17/2022 with 2 polyps removed, one tubular adenoma without dysplasia in sigmoid colon, one polyp removed from prior EMR site-hyperplastic.  There is a family history of colon cancer in his father diagnosed around age 74.  Colonoscopy for surveillance in 5 years, 2027, as polyp from prior EMR site hyperplastic.    Patient Instructions   Antireflux measures: Avoid fried, fatty foods, alcohol, chocolate, coffee, tea,  soft drinks, peppermint and spearmint, spicy foods, tomatoes and tomato based foods, onions, peppers, and smoking.   Other antireflux measures include weight reduction if overweight, avoiding tight clothing around the abdomen, elevating the head of the bed 6 inches with blocks under the head board, and don't drink or eat before going to bed and avoid lying down immediately after meals.  Avoid vaping/smoking.  Esomeprazole 20 mg 1 by mouth in the am 30 minutes before breakfast.  High fiber, low fat diet with liberal water intake.   Metamucil 1 packet/scoop daily or fiber gummies/fiber capsules 2-4 per day.   Bentyl 20 mg 1 po 3 times per day as needed for lower abdominal pain.  Imodium 2 mg 1/2-1 caplet 1-2 times per day  as needed for >3 episodes of diarrhea.   Low FODMAP diet - Avoid dairy. May use lactose free/dairy free alternatives such as almond milk, oat milk, etc.  Xifaxin 1 po 3 times a day x 14 days.   Colonoscopy for surveillance in 5 years, October 2027.  Follow up: 6 months     Low-FODMAP Eating Plan  FODMAP stands for fermentable oligosaccharides, disaccharides, monosaccharides, and polyols. These are sugars that are hard for some people to digest. A low-FODMAP eating plan may help some people who have irritable bowel syndrome (IBS) and certain other bowel (intestinal) diseases to manage their symptoms.  This meal plan can be complicated to follow. Work with a diet and nutrition specialist (dietitian) to make a low-FODMAP eating plan that is right for you. A dietitian can help make sure that you get enough nutrition from this diet.  What are tips for following this plan?  Reading food labels  Check labels for hidden FODMAPs such as:  High-fructose syrup.  Honey.  Agave.  Natural fruit flavors.  Onion or garlic powder.  Choose low-FODMAP foods that contain 3-4 grams of fiber per serving.  Check food labels for serving sizes. Eat only one serving at a time to make sure FODMAP levels stay low.  Shopping  Shop with a list of foods that are recommended on this diet and make a meal plan.  Meal planning  Follow a low-FODMAP eating plan for up to 6 weeks, or as told by your health care provider or dietitian.  To follow the eating plan:  Eliminate high-FODMAP foods from your diet completely. Choose only low-FODMAP foods to eat. You will do this for 2-6 weeks.  Gradually reintroduce high-FODMAP foods into your diet one at a time. Most people should wait a few days before introducing the next new high-FODMAP food into their meal plan. Your dietitian can recommend how quickly you may reintroduce foods.  Keep a daily record of what and how much you eat and drink. Make note of any symptoms that you have after eating.  Review your  "daily record with a dietitian regularly to identify which foods you can eat and which foods you should avoid.  General tips  Drink enough fluid each day to keep your urine pale yellow.  Avoid processed foods. These often have added sugar and may be high in FODMAPs.  Avoid most dairy products, whole grains, and sweeteners.  Work with a dietitian to make sure you get enough fiber in your diet.  Avoid high FODMAP foods at meals to manage symptoms.     Recommended foods  Fruits  Bananas, oranges, tangerines, kelly, limes, blueberries, raspberries, strawberries, grapes, cantaloupe, honeydew melon, kiwi, papaya, passion fruit, and pineapple. Limited amounts of dried cranberries, banana chips, and shredded coconut.  Vegetables  Eggplant, zucchini, cucumber, peppers, green beans, bean sprouts, lettuce, arugula, kale, Swiss chard, spinach, mahesh greens, bok estephania, summer squash, potato, and tomato. Limited amounts of corn, carrot, and sweet potato. Green parts of scallions.  Grains  Gluten-free grains, such as rice, oats, buckwheat, quinoa, corn, polenta, and millet. Gluten-free pasta, bread, or cereal. Rice noodles. Corn tortillas.  Meats and other proteins  Unseasoned beef, pork, poultry, or fish. Eggs. Springer. Tofu (firm) and tempeh. Limited amounts of nuts and seeds, such as almonds, walnuts, brazil nuts, pecans, peanuts, nut butters, pumpkin seeds, efren seeds, and sunflower seeds.  Dairy  Lactose-free milk, yogurt, and kefir. Lactose-free cottage cheese and ice cream. Non-dairy milks, such as almond, coconut, hemp, and rice milk. Non-dairy yogurt. Limited amounts of goat cheese, brie, mozzarella, parmesan, swiss, and other hard cheeses.  Fats and oils  Butter-free spreads. Vegetable oils, such as olive, canola, and sunflower oil.  Seasoning and other foods  Artificial sweeteners with names that do not end in \"ol,\" such as aspartame, saccharine, and stevia. Maple syrup, white table sugar, raw sugar, brown sugar, and " molasses. Mayonnaise, soy sauce, and tamari. Fresh basil, coriander, parsley, rosemary, and thyme.  Beverages  Water and mineral water. Sugar-sweetened soft drinks. Small amounts of orange juice or cranberry juice. Black and green tea. Most dry arlene. Coffee.  The items listed above may not be a complete list of foods and beverages you can eat. Contact a dietitian for more information.     Foods to avoid  Fruits  Fresh, dried, and juiced forms of apple, pear, watermelon, peach, plum, cherries, apricots, blackberries, boysenberries, figs, nectarines, and liliana. Avocado.  Vegetables  Chicory root, artichoke, asparagus, cabbage, snow peas, [place] sprouts, broccoli, sugar snap peas, mushrooms, celery, and cauliflower. Onions, garlic, leeks, and the white part of scallions.  Grains  Wheat, including kamut, durum, and semolina. Barley and bulgur. Couscous. Wheat-based cereals. Wheat noodles, bread, crackers, and pastries.  Meats and other proteins  Fried or fatty meat. Sausage. Cashews and pistachios. Soybeans, baked beans, black beans, chickpeas, kidney beans, anthony beans, navy beans, lentils, black-eyed peas, and split peas.  Dairy  Milk, yogurt, ice cream, and soft cheese. Cream and sour cream. Milk-based sauces. Custard. Buttermilk. Soy milk.  Seasoning and other foods  Any sugar-free gum or candy. Foods that contain artificial sweeteners such as sorbitol, mannitol, isomalt, or xylitol. Foods that contain honey, high-fructose corn syrup, or agave. Bouillon, vegetable stock, beef stock, and chicken stock. Garlic and onion powder. Condiments made with onion, such as hummus, chutney, pickles, relish, salad dressing, and salsa. Tomato paste.  Beverages  Chicory-based drinks. Coffee substitutes. Chamomile tea. Fennel tea. Sweet or fortified arlene such as port or vito. Diet soft drinks made with isomalt, mannitol, maltitol, sorbitol, or xylitol. Apple, pear, and liliana juice. Juices with high-fructose corn syrup.  The  items listed above may not be a complete list of foods and beverages you should avoid. Contact a dietitian for more information.  Summary  FODMAP stands for fermentable oligosaccharides, disaccharides, monosaccharides, and polyols. These are sugars that are hard for some people to digest.  A low-FODMAP eating plan is a short-term diet that helps to ease symptoms of certain bowel diseases.  The eating plan usually lasts up to 6 weeks. After that, high-FODMAP foods are reintroduced gradually and one at a time. This can help you find out which foods may be causing symptoms.  A low-FODMAP eating plan can be complicated. It is best to work with a dietitian who has experience with this type of plan.  This information is not intended to replace advice given to you by your health care provider. Make sure you discuss any questions you have with your health care provider.  Document Revised: 05/06/2021 Document Reviewed: 05/06/2021  [employer] Patient Education © 2022 [employer]     Trinity Rogers, KENAN  7/11/2024    Please note that portions of this note were completed with a voice recognition program.

## 2024-07-11 NOTE — PATIENT INSTRUCTIONS
Antireflux measures: Avoid fried, fatty foods, alcohol, chocolate, coffee, tea,  soft drinks, peppermint and spearmint, spicy foods, tomatoes and tomato based foods, onions, peppers, and smoking.   Other antireflux measures include weight reduction if overweight, avoiding tight clothing around the abdomen, elevating the head of the bed 6 inches with blocks under the head board, and don't drink or eat before going to bed and avoid lying down immediately after meals.  Avoid vaping/smoking.  Esomeprazole 20 mg 1 by mouth in the am 30 minutes before breakfast.  High fiber, low fat diet with liberal water intake.   Metamucil 1 packet/scoop daily or fiber gummies/fiber capsules 2-4 per day.   Bentyl 20 mg 1 po 3 times per day as needed for lower abdominal pain.  Imodium 2 mg 1/2-1 caplet 1-2 times per day as needed for >3 episodes of diarrhea.   Low FODMAP diet - Avoid dairy. May use lactose free/dairy free alternatives such as almond milk, oat milk, etc.  Xifaxin 1 po 3 times a day x 14 days.   Colonoscopy for surveillance in 5 years, October 2027.  Follow up: 6 months     Low-FODMAP Eating Plan  FODMAP stands for fermentable oligosaccharides, disaccharides, monosaccharides, and polyols. These are sugars that are hard for some people to digest. A low-FODMAP eating plan may help some people who have irritable bowel syndrome (IBS) and certain other bowel (intestinal) diseases to manage their symptoms.  This meal plan can be complicated to follow. Work with a diet and nutrition specialist (dietitian) to make a low-FODMAP eating plan that is right for you. A dietitian can help make sure that you get enough nutrition from this diet.  What are tips for following this plan?  Reading food labels  Check labels for hidden FODMAPs such as:  High-fructose syrup.  Honey.  Agave.  Natural fruit flavors.  Onion or garlic powder.  Choose low-FODMAP foods that contain 3-4 grams of fiber per serving.  Check food labels for serving sizes.  Eat only one serving at a time to make sure FODMAP levels stay low.  Shopping  Shop with a list of foods that are recommended on this diet and make a meal plan.  Meal planning  Follow a low-FODMAP eating plan for up to 6 weeks, or as told by your health care provider or dietitian.  To follow the eating plan:  Eliminate high-FODMAP foods from your diet completely. Choose only low-FODMAP foods to eat. You will do this for 2-6 weeks.  Gradually reintroduce high-FODMAP foods into your diet one at a time. Most people should wait a few days before introducing the next new high-FODMAP food into their meal plan. Your dietitian can recommend how quickly you may reintroduce foods.  Keep a daily record of what and how much you eat and drink. Make note of any symptoms that you have after eating.  Review your daily record with a dietitian regularly to identify which foods you can eat and which foods you should avoid.  General tips  Drink enough fluid each day to keep your urine pale yellow.  Avoid processed foods. These often have added sugar and may be high in FODMAPs.  Avoid most dairy products, whole grains, and sweeteners.  Work with a dietitian to make sure you get enough fiber in your diet.  Avoid high FODMAP foods at meals to manage symptoms.     Recommended foods  Fruits  Bananas, oranges, tangerines, kelly, limes, blueberries, raspberries, strawberries, grapes, cantaloupe, honeydew melon, kiwi, papaya, passion fruit, and pineapple. Limited amounts of dried cranberries, banana chips, and shredded coconut.  Vegetables  Eggplant, zucchini, cucumber, peppers, green beans, bean sprouts, lettuce, arugula, kale, Swiss chard, spinach, mahesh greens, bok estephania, summer squash, potato, and tomato. Limited amounts of corn, carrot, and sweet potato. Green parts of scallions.  Grains  Gluten-free grains, such as rice, oats, buckwheat, quinoa, corn, polenta, and millet. Gluten-free pasta, bread, or cereal. Rice noodles. Corn  "tortillas.  Meats and other proteins  Unseasoned beef, pork, poultry, or fish. Eggs. Springer. Tofu (firm) and tempeh. Limited amounts of nuts and seeds, such as almonds, walnuts, brazil nuts, pecans, peanuts, nut butters, pumpkin seeds, efren seeds, and sunflower seeds.  Dairy  Lactose-free milk, yogurt, and kefir. Lactose-free cottage cheese and ice cream. Non-dairy milks, such as almond, coconut, hemp, and rice milk. Non-dairy yogurt. Limited amounts of goat cheese, brie, mozzarella, parmesan, swiss, and other hard cheeses.  Fats and oils  Butter-free spreads. Vegetable oils, such as olive, canola, and sunflower oil.  Seasoning and other foods  Artificial sweeteners with names that do not end in \"ol,\" such as aspartame, saccharine, and stevia. Maple syrup, white table sugar, raw sugar, brown sugar, and molasses. Mayonnaise, soy sauce, and tamari. Fresh basil, coriander, parsley, rosemary, and thyme.  Beverages  Water and mineral water. Sugar-sweetened soft drinks. Small amounts of orange juice or cranberry juice. Black and green tea. Most dry arlene. Coffee.  The items listed above may not be a complete list of foods and beverages you can eat. Contact a dietitian for more information.     Foods to avoid  Fruits  Fresh, dried, and juiced forms of apple, pear, watermelon, peach, plum, cherries, apricots, blackberries, boysenberries, figs, nectarines, and liliana. Avocado.  Vegetables  Chicory root, artichoke, asparagus, cabbage, snow peas, [place] sprouts, broccoli, sugar snap peas, mushrooms, celery, and cauliflower. Onions, garlic, leeks, and the white part of scallions.  Grains  Wheat, including kamut, durum, and semolina. Barley and bulgur. Couscous. Wheat-based cereals. Wheat noodles, bread, crackers, and pastries.  Meats and other proteins  Fried or fatty meat. Sausage. Cashews and pistachios. Soybeans, baked beans, black beans, chickpeas, kidney beans, anthony beans, navy beans, lentils, black-eyed peas, and split " peas.  Dairy  Milk, yogurt, ice cream, and soft cheese. Cream and sour cream. Milk-based sauces. Custard. Buttermilk. Soy milk.  Seasoning and other foods  Any sugar-free gum or candy. Foods that contain artificial sweeteners such as sorbitol, mannitol, isomalt, or xylitol. Foods that contain honey, high-fructose corn syrup, or agave. Bouillon, vegetable stock, beef stock, and chicken stock. Garlic and onion powder. Condiments made with onion, such as hummus, chutney, pickles, relish, salad dressing, and salsa. Tomato paste.  Beverages  Chicory-based drinks. Coffee substitutes. Chamomile tea. Fennel tea. Sweet or fortified arlene such as port or vito. Diet soft drinks made with isomalt, mannitol, maltitol, sorbitol, or xylitol. Apple, pear, and liliana juice. Juices with high-fructose corn syrup.  The items listed above may not be a complete list of foods and beverages you should avoid. Contact a dietitian for more information.  Summary  FODMAP stands for fermentable oligosaccharides, disaccharides, monosaccharides, and polyols. These are sugars that are hard for some people to digest.  A low-FODMAP eating plan is a short-term diet that helps to ease symptoms of certain bowel diseases.  The eating plan usually lasts up to 6 weeks. After that, high-FODMAP foods are reintroduced gradually and one at a time. This can help you find out which foods may be causing symptoms.  A low-FODMAP eating plan can be complicated. It is best to work with a dietitian who has experience with this type of plan.  This information is not intended to replace advice given to you by your health care provider. Make sure you discuss any questions you have with your health care provider.  Document Revised: 05/06/2021 Document Reviewed: 05/06/2021  Elsevier Patient Education © 2022 [employer]

## 2024-07-15 ENCOUNTER — TELEPHONE (OUTPATIENT)
Dept: GASTROENTEROLOGY | Facility: CLINIC | Age: 59
End: 2024-07-15
Payer: COMMERCIAL

## 2024-07-15 DIAGNOSIS — R91.8 PULMONARY NODULES: Primary | ICD-10-CM

## 2024-07-15 DIAGNOSIS — R93.3 ABNORMAL CT SCAN, COLON: ICD-10-CM

## 2024-07-15 DIAGNOSIS — Z86.010 PERSONAL HISTORY OF COLONIC POLYPS: ICD-10-CM

## 2024-07-15 DIAGNOSIS — Z80.0 FAMILY HISTORY OF COLON CANCER IN FATHER: ICD-10-CM

## 2024-07-15 RX ORDER — SODIUM, POTASSIUM,MAG SULFATES 17.5-3.13G
SOLUTION, RECONSTITUTED, ORAL ORAL
Qty: 177 ML | Refills: 0 | Status: SHIPPED | OUTPATIENT
Start: 2024-07-15

## 2024-07-15 RX ORDER — BISACODYL 5 MG/1
TABLET, DELAYED RELEASE ORAL
Qty: 4 TABLET | Refills: 0 | Status: SHIPPED | OUTPATIENT
Start: 2024-07-15

## 2024-07-15 NOTE — TELEPHONE ENCOUNTER
I called and tried to offer July 31,2024 at 9:30 but the patient did not answer. I have the spot held just in case he calls back.

## 2024-07-15 NOTE — TELEPHONE ENCOUNTER
Called patient and discussed result of CT scan. He is agreeable for urgent colonoscopy and referral to pulmonology. Results have been faxed to his PCP, Eleonora Garcia. He will also contact her office for any further instructions.

## 2024-07-18 ENCOUNTER — OFFICE VISIT (OUTPATIENT)
Dept: PULMONOLOGY | Facility: CLINIC | Age: 59
End: 2024-07-18
Payer: COMMERCIAL

## 2024-07-18 VITALS
BODY MASS INDEX: 32.91 KG/M2 | HEART RATE: 48 BPM | RESPIRATION RATE: 18 BRPM | DIASTOLIC BLOOD PRESSURE: 88 MMHG | WEIGHT: 192.8 LBS | SYSTOLIC BLOOD PRESSURE: 142 MMHG | HEIGHT: 64 IN | OXYGEN SATURATION: 97 %

## 2024-07-18 DIAGNOSIS — R93.89 ABNORMAL CT OF THE CHEST: Primary | ICD-10-CM

## 2024-07-18 DIAGNOSIS — E66.9 OBESITY (BMI 30-39.9): ICD-10-CM

## 2024-07-18 DIAGNOSIS — R91.8 LUNG NODULE, MULTIPLE: ICD-10-CM

## 2024-07-18 NOTE — PROGRESS NOTES
"  CONSULT NOTE    Requested by:   Eleonora Garcia APRN      Chief Complaint   Patient presents with    Abnormal Chest X-ray    Consult       Subjective:  Robinson Tovar is a 59 y.o. male.   Patient comes in today for consultation because of abnormal CT.    Patient underwent a CT due to abdominal issues. he was told that the imaging study showed a 6 millimeter lung nodule for which a pulmonology consultation was requested    The patient describes no family members with a history of lung cancer.      he did have CoVid in Jan of 2020.    he does work with welding.     The following portions of the patient's history were reviewed and updated as appropriate: allergies, current medications, past family history, past medical history, past social history, and past surgical history.    Review of Systems   HENT:  Positive for sinus pressure. Negative for sneezing and sore throat.    Respiratory:  Positive for cough. Negative for chest tightness, shortness of breath and wheezing.    Psychiatric/Behavioral:  Negative for sleep disturbance.    All other systems reviewed and are negative.      Past Medical History:   Diagnosis Date    Diabetes mellitus     Disease of thyroid gland     Early satiety     Elevated cholesterol     Gastroparesis     GERD (gastroesophageal reflux disease)     Heart murmur     as a child    Hyperlipidemia     Hypertension        Social History     Tobacco Use    Smoking status: Never    Smokeless tobacco: Former     Types: Chew     Quit date: 4/22/2020   Substance Use Topics    Alcohol use: Not Currently         Objective:  Visit Vitals  /88   Pulse (!) 48   Resp 18   Ht 162.6 cm (64\") Comment: pt reported   Wt 87.5 kg (192 lb 12.8 oz)   SpO2 97%   BMI 33.09 kg/m²       BMI Readings from Last 8 Encounters:   07/18/24 33.09 kg/m²   07/11/24 36.03 kg/m²   01/22/24 36.03 kg/m²   01/23/23 35.12 kg/m²   10/14/22 32.92 kg/m²   06/01/22 30.73 kg/m²   06/03/21 34.16 kg/m²   04/22/21 34.50 kg/m² "       Physical Exam  Vitals reviewed.   Constitutional:       Appearance: He is well-developed.   HENT:      Head: Atraumatic.      Mouth/Throat:      Comments: Oropharynx was crowded.  Tonsils were enlarged.   Eyes:      Pupils: Pupils are equal, round, and reactive to light.   Neck:      Thyroid: No thyromegaly.      Vascular: No JVD.      Trachea: No tracheal deviation.   Cardiovascular:      Rate and Rhythm: Regular rhythm. Bradycardia present.   Pulmonary:      Effort: Pulmonary effort is normal. No respiratory distress.      Breath sounds: Normal breath sounds.   Musculoskeletal:      Right lower leg: No edema.      Left lower leg: No edema.      Comments: Gait was normal.   Skin:     General: Skin is warm and dry.   Neurological:      Mental Status: He is alert and oriented to person, place, and time.           Assessment/Plan:  Diagnoses and all orders for this visit:    1. Abnormal CT of the chest (Primary)  -     CT Chest Without Contrast Diagnostic; Future    2. Lung nodule, multiple  -     CT Chest Without Contrast Diagnostic; Future    3. Obesity (BMI 30-39.9)        Return in about 6 weeks (around 8/29/2024) for Recheck, Imaging, For Romy Cruz), ....Also 7-8 mths w/ Dr. Beckett.    DISCUSSION(if any):  I reviewed the patient's CT of the abdomen performed in March 2024.  It does reveal bibasilar lung nodules, 5 and 6 mm.    ===========================  ===========================    Laboratory data was reviewed.  Hemoglobin was 16, hematocrit 47, platelet count of 296.  CO2 level was 21, creatinine was 1.05 and AST and ALT were normal.  These were performed in January 2024.    ===========================  ===========================    Based on the history obtained today, and based on the latest guidelines, the patient will need a repeat CT chest soon.    If the next CT shows no other findings and the nodules are still 6 mm or less, then he will need another CT in 6-8 months.     Based on the  results of the CT scan, further recommendations will be made.    The patient was asked to call this office if he develops any weight loss, hemoptysis, night sweats etc.    Weight loss advised.      Dictated utilizing Dragon dictation.    This document was electronically signed by Josh Beckett MD on 07/18/24 at 16:15 EDT

## 2024-07-23 NOTE — TELEPHONE ENCOUNTER
I have tried and am actively trying to get in touch with him, but I can't even get him on the phone.

## 2024-07-23 NOTE — TELEPHONE ENCOUNTER
Have you been able to get in touch with him? He knows he needs the colonoscopy, we discussed it. He may need to be called later in the afternoon, after 3-4, since he works. It may be easier to get in touch with him at that time.

## 2024-08-26 ENCOUNTER — PREP FOR SURGERY (OUTPATIENT)
Dept: OTHER | Facility: HOSPITAL | Age: 59
End: 2024-08-26
Payer: COMMERCIAL

## 2024-08-26 ENCOUNTER — TELEPHONE (OUTPATIENT)
Dept: GASTROENTEROLOGY | Facility: CLINIC | Age: 59
End: 2024-08-26
Payer: COMMERCIAL

## 2024-08-26 DIAGNOSIS — R93.3 ABNORMAL CT SCAN, COLON: ICD-10-CM

## 2024-08-26 DIAGNOSIS — Z86.010 PERSONAL HISTORY OF COLONIC POLYPS: Primary | ICD-10-CM

## 2024-08-26 RX ORDER — POLYETHYLENE GLYCOL 3350 17 G/17G
POWDER, FOR SOLUTION ORAL
Qty: 238 G | Refills: 0 | Status: SHIPPED | OUTPATIENT
Start: 2024-08-26

## 2024-08-26 RX ORDER — SODIUM CHLORIDE 9 MG/ML
70 INJECTION, SOLUTION INTRAVENOUS CONTINUOUS PRN
OUTPATIENT
Start: 2024-08-26

## 2024-08-26 RX ORDER — BISACODYL 5 MG/1
TABLET, DELAYED RELEASE ORAL
Qty: 4 TABLET | Refills: 0 | Status: SHIPPED | OUTPATIENT
Start: 2024-08-26

## 2024-08-26 NOTE — TELEPHONE ENCOUNTER
----- Message from Jenniffer GUZMAN sent at 8/26/2024  1:53 PM EDT -----  He is scheduled, 09/11/24 cause I need to mail his paperwork.  Can you send Miralax, cause he lives in [facility] and is coming at 730?  ----- Message -----  From: Trinity Rogers APRN  Sent: 8/26/2024  12:16 PM EDT  To: Jenniffer Adan MA    Did he ever call back to schedule the colonoscopy after you mailed the letter? If not, can you try calling him again? Thank you.

## 2024-09-04 PROBLEM — R93.3 ABNORMAL CT SCAN, COLON: Status: ACTIVE | Noted: 2024-08-26

## 2024-09-11 ENCOUNTER — ANESTHESIA EVENT (OUTPATIENT)
Dept: GASTROENTEROLOGY | Facility: HOSPITAL | Age: 59
End: 2024-09-11
Payer: COMMERCIAL

## 2024-09-11 ENCOUNTER — HOSPITAL ENCOUNTER (OUTPATIENT)
Facility: HOSPITAL | Age: 59
Setting detail: HOSPITAL OUTPATIENT SURGERY
Discharge: HOME | End: 2024-09-11
Attending: INTERNAL MEDICINE | Admitting: INTERNAL MEDICINE
Payer: COMMERCIAL

## 2024-09-11 ENCOUNTER — ANESTHESIA (OUTPATIENT)
Dept: GASTROENTEROLOGY | Facility: HOSPITAL | Age: 59
End: 2024-09-11
Payer: COMMERCIAL

## 2024-09-11 VITALS
RESPIRATION RATE: 16 BRPM | TEMPERATURE: 97 F | HEART RATE: 52 BPM | OXYGEN SATURATION: 99 % | DIASTOLIC BLOOD PRESSURE: 72 MMHG | BODY MASS INDEX: 33.63 KG/M2 | WEIGHT: 197 LBS | HEIGHT: 64 IN | SYSTOLIC BLOOD PRESSURE: 117 MMHG

## 2024-09-11 DIAGNOSIS — K52.9 CHRONIC DIARRHEA: Primary | ICD-10-CM

## 2024-09-11 DIAGNOSIS — Z86.010 PERSONAL HISTORY OF COLONIC POLYPS: ICD-10-CM

## 2024-09-11 DIAGNOSIS — R93.3 ABNORMAL CT SCAN, COLON: ICD-10-CM

## 2024-09-11 LAB — GLUCOSE BLDC GLUCOMTR-MCNC: 107 MG/DL (ref 70–130)

## 2024-09-11 PROCEDURE — 82948 REAGENT STRIP/BLOOD GLUCOSE: CPT

## 2024-09-11 PROCEDURE — 45385 COLONOSCOPY W/LESION REMOVAL: CPT | Performed by: INTERNAL MEDICINE

## 2024-09-11 PROCEDURE — 25010000002 PROPOFOL 10 MG/ML EMULSION: Performed by: NURSE ANESTHETIST, CERTIFIED REGISTERED

## 2024-09-11 PROCEDURE — 25810000003 SODIUM CHLORIDE 0.9 % SOLUTION: Performed by: NURSE PRACTITIONER

## 2024-09-11 PROCEDURE — 45380 COLONOSCOPY AND BIOPSY: CPT | Performed by: INTERNAL MEDICINE

## 2024-09-11 RX ORDER — SIMETHICONE 40MG/0.6ML
SUSPENSION, DROPS(FINAL DOSAGE FORM)(ML) ORAL AS NEEDED
Status: DISCONTINUED | OUTPATIENT
Start: 2024-09-11 | End: 2024-09-11 | Stop reason: HOSPADM

## 2024-09-11 RX ORDER — SODIUM CHLORIDE 9 MG/ML
70 INJECTION, SOLUTION INTRAVENOUS CONTINUOUS PRN
Status: DISCONTINUED | OUTPATIENT
Start: 2024-09-11 | End: 2024-09-11 | Stop reason: HOSPADM

## 2024-09-11 RX ORDER — PROPOFOL 10 MG/ML
VIAL (ML) INTRAVENOUS AS NEEDED
Status: DISCONTINUED | OUTPATIENT
Start: 2024-09-11 | End: 2024-09-11 | Stop reason: SURG

## 2024-09-11 RX ORDER — MONTELUKAST SODIUM 4 MG/1
1 TABLET, CHEWABLE ORAL 2 TIMES DAILY
Qty: 60 TABLET | Refills: 2 | Status: SHIPPED | OUTPATIENT
Start: 2024-09-11

## 2024-09-11 RX ADMIN — PROPOFOL 100 MG: 10 INJECTION, EMULSION INTRAVENOUS at 08:36

## 2024-09-11 RX ADMIN — LIDOCAINE HYDROCHLORIDE 60 MG: 20 INJECTION, SOLUTION INTRAVENOUS at 08:36

## 2024-09-11 RX ADMIN — SODIUM CHLORIDE 70 ML/HR: 9 INJECTION, SOLUTION INTRAVENOUS at 07:58

## 2024-09-11 RX ADMIN — PROPOFOL 170 MCG/KG/MIN: 10 INJECTION, EMULSION INTRAVENOUS at 08:37

## 2024-09-11 NOTE — ANESTHESIA POSTPROCEDURE EVALUATION
Patient: Robinson Tovar    Procedure Summary       Date: 09/11/24 Room / Location: [facility] ENDOSCOPY 2 / [facility] ENDOSCOPY    Anesthesia Start: 0824 Anesthesia Stop: 0900    Procedure: COLONOSCOPY WITH BIOPSY AND POLYPECTOMY (Anus) Diagnosis:       Personal history of colonic polyps      Abnormal CT scan, colon      (Personal history of colonic polyps [Z86.010])      (Abnormal CT scan, colon [R93.3])    Surgeons: Cheko Rogel MD Provider: Elkin Hernandez CRNA    Anesthesia Type: MAC ASA Status: 2            Anesthesia Type: MAC    Vitals  No vitals data found for the desired time range.          Post Anesthesia Care and Evaluation    Patient location during evaluation: bedside  Patient participation: complete - patient participated  Level of consciousness: awake and alert  Pain score: 0  Pain management: satisfactory to patient    Airway patency: patent  Anesthetic complications: No anesthetic complications  PONV Status: none  Cardiovascular status: acceptable and stable  Respiratory status: acceptable  Hydration status: acceptable    Comments: Vitals signs as noted in nursing documentation as per protocol.

## 2024-09-11 NOTE — H&P
[facility]  HISTORY AND PHYSICAL    Patient Name: Robinson Tovar  : 1965  MRN: [id number]    Chief Complaint:   For  colonoscopy    History Of Presenting Illness:    Abnormal CTAP    Past Medical History:   Diagnosis Date    Diabetes mellitus     Disease of thyroid gland     Early satiety     Elevated cholesterol     Gastroparesis     GERD (gastroesophageal reflux disease)     Heart murmur     as a child    Hyperlipidemia     Hypertension        Past Surgical History:   Procedure Laterality Date    COLONOSCOPY      COLONOSCOPY N/A 2021    Procedure: COLONOSCOPY WITH BIOPSIES, AND RESOLUTION CLIPS;  Surgeon: Cheko Rogel MD;  Location: [facility] ENDOSCOPY;  Service: Gastroenterology;  Laterality: N/A;    COLONOSCOPY N/A 10/17/2022    Procedure: COLONOSCOPY with polypectomy ;  Surgeon: Cheko oRgel MD;  Location: [facility] ENDOSCOPY;  Service: Gastroenterology;  Laterality: N/A;    ENDOSCOPY N/A 2021    Procedure: ESOPHAGOGASTRODUODENOSCOPY WITH BIOPSIES;  Surgeon: Cheko Rogel MD;  Location: [facility] ENDOSCOPY;  Service: Gastroenterology;  Laterality: N/A;    EYE SURGERY Right 2017    artificial lens placed    KIDNEY STONE SURGERY         Social History     Socioeconomic History    Marital status:    Tobacco Use    Smoking status: Never    Smokeless tobacco: Former     Types: Chew     Quit date: 2020   Vaping Use    Vaping status: Never Used   Substance and Sexual Activity    Alcohol use: Not Currently    Drug use: Never    Sexual activity: Defer       Family History   Problem Relation Age of Onset    Diabetes Father     Colon cancer Father 74    Cirrhosis Neg Hx     Liver disease Neg Hx     Liver cancer Neg Hx        Prior to Admission Medications:  Medications Prior to Admission   Medication Sig Dispense Refill Last Dose    amLODIPine (NORVASC) 10 MG tablet Take 1 tablet by mouth Daily.   9/10/2024    bisacodyl (DULCOLAX) 5 MG EC tablet Take as  directed for colon prep 4 tablet 0 9/10/2024    Continuous Blood Gluc Sensor (Dexcom G7 Sensor) misc CHANGE every 10 DAYS AS DIRECTED   9/10/2024    dicyclomine (BENTYL) 20 MG tablet Take 1 tablet by mouth 3 (Three) Times a Day As Needed for Abdominal Cramping. 45 tablet 2 9/10/2024    ergocalciferol (ERGOCALCIFEROL) 1.25 MG (60498 UT) capsule Take 1 capsule by mouth 1 (One) Time Per Week.   9/10/2024    esomeprazole (nexIUM) 20 MG capsule Take 1 capsule by mouth Every Morning Before Breakfast. 90 capsule 3 9/10/2024    insulin aspart (novoLOG FLEXPEN) 100 UNIT/ML solution pen-injector sc pen Inject  under the skin into the appropriate area as directed 3 (Three) Times a Day With Meals. 10-15 units sc with Meals (depends on blood sugar per pt report).   Per pharmacy @ [facility]:  Max dose 70 units TID with meals.   9/10/2024    insulin detemir (LEVEMIR) 100 UNIT/ML injection Inject 100 Units under the skin into the appropriate area as directed Every Night.   9/10/2024    metoprolol succinate XL (TOPROL-XL) 100 MG 24 hr tablet Take 1 tablet by mouth Daily.   9/10/2024 at 2200    polyethylene glycol (MiraLax) 17 GM/SCOOP powder Take as directed for colonoscopy prep 238 g 0 9/10/2024       Allergies:  Allergies   Allergen Reactions    Sulfa Antibiotics Anaphylaxis    Apidra [Insulin Glulisine] Nausea And Vomiting    Basaglar Kwikpen [Insulin Glargine] Nausea And Vomiting     Nausea, vomiting, diarrhea    Humalog [Insulin Lispro] GI Intolerance    Tresiba [Insulin Degludec] Diarrhea     Diarrhea and stomach cramps    Xultophy [Insulin Degludec-Liraglutide] Diarrhea     Diarrhea and stomach cramps    Penicillins Other (See Comments)     Pt unsure of reaction          Vitals: Temp:  [97.7 °F (36.5 °C)] 97.7 °F (36.5 °C)  Heart Rate:  [60] 60  Resp:  [14] 14  BP: (165)/(86) 165/86    Review Of Systems:  Constitutional:  Negative for chills, fever, and unexpected weight change.  Respiratory:  Negative for cough,  chest tightness, shortness of breath, and wheezing.  Cardiovascular:  Negative for chest pain, palpitations, and leg swelling.  Gastrointestinal:  Negative for abdominal distention, abdominal pain, nausea, vomiting.  Neurological:  Negative for weakness, numbness, and headaches.     Physical Exam:    General Appearance:  Alert, cooperative, in no acute distress.   Lungs:   Clear to auscultation, respirations regular, even and                 unlabored.   Heart:  Regular rhythm and normal rate.   Abdomen:   Normal bowel sounds, no masses, no organomegaly. Soft, nontender, nondistended   Neurologic: Alert and oriented x 3. Moves all four limbs equally       Assessment & Plan     Assessment:  Principal Problem:    Abnormal CT scan, colon  Active Problems:    Personal history of colonic polyps      Plan: Colonoscopy with possible biopsy, polypectomy, ablation of arteriovenous malformations, or control of bleeding. (N/A)     Cheko Rogel MD  9/11/2024

## 2024-09-11 NOTE — ANESTHESIA PREPROCEDURE EVALUATION
Anesthesia Evaluation     Patient summary reviewed and Nursing notes reviewed   NPO Solid Status: > 8 hours  NPO Liquid Status: > 8 hours           Airway   Mallampati: II  TM distance: >3 FB  Neck ROM: full  No difficulty expected  Dental      Pulmonary - negative pulmonary ROS and normal exam   Cardiovascular - normal exam  Exercise tolerance: good (4-7 METS)    (+) hypertension well controlled less than 2 medications, valvular problems/murmurs murmur, hyperlipidemia      Neuro/Psych- negative ROS  GI/Hepatic/Renal/Endo    (+) GERD well controlled, diabetes mellitus type 1 well controlled using insulin, thyroid problem     Musculoskeletal (-) negative ROS    Abdominal  - normal exam   Substance History - negative use     OB/GYN negative ob/gyn ROS         Other                          Anesthesia Plan    ASA 2     MAC     (Risks and benefits discussed including risk of aspiration, recall and dental damage. All patient questions answered.    Will continue with plan of care.)  intravenous induction     Anesthetic plan, risks, benefits, and alternatives have been provided, discussed and informed consent has been obtained with: patient.  Pre-procedure education provided  Plan discussed with CRNA.

## 2024-09-11 NOTE — DISCHARGE INSTRUCTIONS
No pushing, pulling, tugging,  heavy lifting, or strenuous activity.  No major decision making, driving, or drinking alcoholic beverages for 24 hours. ( due to the medications you have  received)  Always use good hand hygiene/washing techniques.  NO driving while taking pain medications.    * if you have an incision:  Check your incision area every day for signs of infection.   Check for:  * more redness, swelling, or pain  *more fluid or blood  *warmth  *pus or bad smell    To assist you in voiding:  Drink plenty of fluids  Listen to running water while attempting to void.    If you are unable to urinate and you have an uncomfortable urge to void or it has been   6 hours since you were discharged, return to the Emergency Room    - Discharge patient to home (ambulatory).   - Resume FODMAP diet.   - Continue present medications.   - Stool for c -diff / calprotectin   - Consider small bowel pillcam depend on symptoms  - Trial of colestipol BID or TID  - Await pathology results.   - Repeat colonoscopy in 5 years for surveillance.   - Return to GI office in 8 weeks.

## 2024-09-12 LAB — REF LAB TEST METHOD: NORMAL

## 2024-10-02 ENCOUNTER — TELEPHONE (OUTPATIENT)
Dept: PULMONOLOGY | Facility: CLINIC | Age: 59
End: 2024-10-02
Payer: COMMERCIAL

## 2024-10-02 DIAGNOSIS — R91.8 LUNG NODULE, MULTIPLE: Primary | ICD-10-CM

## 2024-10-02 NOTE — TELEPHONE ENCOUNTER
[facility] HAD TRIED TO REACH THE PATIENT TO SCHEDULE AN APPT BUT NEVER COULD REACH THE PATIENT. PATIENT WAS THEN SENT A LETTER. ONCE THE PATIENT HAD RECEIVED THE LETTER HE CALLED BACK TO SCHEDULE BUT THE ORDER HAD . STAFF MESSAGE SENT TO DR. LOWERY TO SEE IF HE WANTED PATIENT TO BE SEEN FIRST OR IF HE WANTED TO RENEW THE ORDER.

## 2024-10-15 ENCOUNTER — HOSPITAL ENCOUNTER (OUTPATIENT)
Facility: HOSPITAL | Age: 59
Discharge: HOME | End: 2024-10-15
Admitting: INTERNAL MEDICINE
Payer: COMMERCIAL

## 2024-10-15 DIAGNOSIS — R91.8 LUNG NODULE, MULTIPLE: ICD-10-CM

## 2024-10-15 PROCEDURE — 71250 CT THORAX DX C-: CPT

## 2025-01-09 ENCOUNTER — OFFICE VISIT (OUTPATIENT)
Dept: GASTROENTEROLOGY | Facility: CLINIC | Age: 60
End: 2025-01-09
Payer: COMMERCIAL

## 2025-01-09 VITALS
WEIGHT: 206 LBS | DIASTOLIC BLOOD PRESSURE: 80 MMHG | HEART RATE: 84 BPM | BODY MASS INDEX: 35.36 KG/M2 | SYSTOLIC BLOOD PRESSURE: 140 MMHG | OXYGEN SATURATION: 98 %

## 2025-01-09 DIAGNOSIS — K86.89 PANCREATIC INSUFFICIENCY: Chronic | ICD-10-CM

## 2025-01-09 DIAGNOSIS — K80.20 CALCULUS OF GALLBLADDER WITHOUT CHOLECYSTITIS WITHOUT OBSTRUCTION: ICD-10-CM

## 2025-01-09 DIAGNOSIS — D12.6 ADENOMATOUS POLYP OF COLON, UNSPECIFIED PART OF COLON: ICD-10-CM

## 2025-01-09 DIAGNOSIS — R19.7 DIARRHEA, UNSPECIFIED TYPE: Primary | Chronic | ICD-10-CM

## 2025-01-09 DIAGNOSIS — Z80.0 FAMILY HISTORY OF COLON CANCER IN FATHER: ICD-10-CM

## 2025-01-09 DIAGNOSIS — K21.9 GASTROESOPHAGEAL REFLUX DISEASE WITHOUT ESOPHAGITIS: Chronic | ICD-10-CM

## 2025-01-09 DIAGNOSIS — K58.0 IRRITABLE BOWEL SYNDROME WITH DIARRHEA: Chronic | ICD-10-CM

## 2025-01-09 DIAGNOSIS — R10.30 LOWER ABDOMINAL PAIN: Chronic | ICD-10-CM

## 2025-01-09 PROCEDURE — 99214 OFFICE O/P EST MOD 30 MIN: CPT | Performed by: NURSE PRACTITIONER

## 2025-01-09 RX ORDER — LISINOPRIL 40 MG/1
1 TABLET ORAL DAILY
COMMUNITY
Start: 2024-12-24

## 2025-01-09 RX ORDER — SACCHAROMYCES BOULARDII 250 MG
250 CAPSULE ORAL 2 TIMES DAILY
Qty: 60 CAPSULE | Refills: 5 | Status: SHIPPED | OUTPATIENT
Start: 2025-01-09

## 2025-01-09 RX ORDER — COLESTIPOL HYDROCHLORIDE 1 G/1
2 TABLET ORAL 2 TIMES DAILY
Qty: 360 TABLET | Refills: 3 | Status: SHIPPED | OUTPATIENT
Start: 2025-01-09

## 2025-01-09 NOTE — PATIENT INSTRUCTIONS
Antireflux measures: Avoid fried, fatty foods, alcohol, chocolate, coffee, tea,  soft drinks, peppermint and spearmint, spicy foods, tomatoes and tomato based foods, onions, peppers, and smoking.   Other antireflux measures include weight reduction if overweight, avoiding tight clothing around the abdomen, elevating the head of the bed 6 inches with blocks under the head board, and don't drink or eat before going to bed and avoid lying down immediately after meals.  Avoid vaping/smoking.  Esomeprazole 20 mg 1 by mouth in the am 30 minutes before breakfast.  High fiber, low fat diet with liberal water intake.   Metamucil 1 packet/scoop daily or fiber gummies/fiber capsules 2-4 per day.   Colestid 1 gm 2 po twice a day. Decrease to once a day if having constipation.   Bentyl 20 mg 1 po 3 times per day as needed for lower abdominal pain.  Imodium 2 mg 1/2-1 caplet 1-2 times per day as needed for >3 episodes of diarrhea.   Low FODMAP diet - Avoid dairy. May use lactose free/dairy free alternatives such as almond milk, oat milk, etc.  Colonoscopy for surveillance in 5 years, September 2029.  Discussed Pill[employer]-patient wants to wait for now. He will call back if he wants to schedule.   Will call to obtain copies of recent labs.   Follow up: 6 months         Low-FODMAP Eating Plan  FODMAP stands for fermentable oligosaccharides, disaccharides, monosaccharides, and polyols. These are sugars that are hard for some people to digest. A low-FODMAP eating plan may help some people who have irritable bowel syndrome (IBS) and certain other bowel (intestinal) diseases to manage their symptoms.  This meal plan can be complicated to follow. Work with a diet and nutrition specialist (dietitian) to make a low-FODMAP eating plan that is right for you. A dietitian can help make sure that you get enough nutrition from this diet.  What are tips for following this plan?  Reading food labels  Check labels for hidden FODMAPs such  as:  High-fructose syrup.  Honey.  Agave.  Natural fruit flavors.  Onion or garlic powder.  Choose low-FODMAP foods that contain 3-4 grams of fiber per serving.  Check food labels for serving sizes. Eat only one serving at a time to make sure FODMAP levels stay low.  Shopping  Shop with a list of foods that are recommended on this diet and make a meal plan.  Meal planning  Follow a low-FODMAP eating plan for up to 6 weeks, or as told by your health care provider or dietitian.  To follow the eating plan:  Eliminate high-FODMAP foods from your diet completely. Choose only low-FODMAP foods to eat. You will do this for 2-6 weeks.  Gradually reintroduce high-FODMAP foods into your diet one at a time. Most people should wait a few days before introducing the next new high-FODMAP food into their meal plan. Your dietitian can recommend how quickly you may reintroduce foods.  Keep a daily record of what and how much you eat and drink. Make note of any symptoms that you have after eating.  Review your daily record with a dietitian regularly to identify which foods you can eat and which foods you should avoid.  General tips  Drink enough fluid each day to keep your urine pale yellow.  Avoid processed foods. These often have added sugar and may be high in FODMAPs.  Avoid most dairy products, whole grains, and sweeteners.  Work with a dietitian to make sure you get enough fiber in your diet.  Avoid high FODMAP foods at meals to manage symptoms.     Recommended foods  Fruits  Bananas, oranges, tangerines, kelly, limes, blueberries, raspberries, strawberries, grapes, cantaloupe, honeydew melon, kiwi, papaya, passion fruit, and pineapple. Limited amounts of dried cranberries, banana chips, and shredded coconut.  Vegetables  Eggplant, zucchini, cucumber, peppers, green beans, bean sprouts, lettuce, arugula, kale, Swiss chard, spinach, mahesh greens, bok estephania, summer squash, potato, and tomato. Limited amounts of corn, carrot, and  "sweet potato. Green parts of scallions.  Grains  Gluten-free grains, such as rice, oats, buckwheat, quinoa, corn, polenta, and millet. Gluten-free pasta, bread, or cereal. Rice noodles. Corn tortillas.  Meats and other proteins  Unseasoned beef, pork, poultry, or fish. Eggs. Springer. Tofu (firm) and tempeh. Limited amounts of nuts and seeds, such as almonds, walnuts, brazil nuts, pecans, peanuts, nut butters, pumpkin seeds, efren seeds, and sunflower seeds.  Dairy  Lactose-free milk, yogurt, and kefir. Lactose-free cottage cheese and ice cream. Non-dairy milks, such as almond, coconut, hemp, and rice milk. Non-dairy yogurt. Limited amounts of goat cheese, brie, mozzarella, parmesan, swiss, and other hard cheeses.  Fats and oils  Butter-free spreads. Vegetable oils, such as olive, canola, and sunflower oil.  Seasoning and other foods  Artificial sweeteners with names that do not end in \"ol,\" such as aspartame, saccharine, and stevia. Maple syrup, white table sugar, raw sugar, brown sugar, and molasses. Mayonnaise, soy sauce, and tamari. Fresh basil, coriander, parsley, rosemary, and thyme.  Beverages  Water and mineral water. Sugar-sweetened soft drinks. Small amounts of orange juice or cranberry juice. Black and green tea. Most dry arlene. Coffee.  The items listed above may not be a complete list of foods and beverages you can eat. Contact a dietitian for more information.     Foods to avoid  Fruits  Fresh, dried, and juiced forms of apple, pear, watermelon, peach, plum, cherries, apricots, blackberries, boysenberries, figs, nectarines, and liliana. Avocado.  Vegetables  Chicory root, artichoke, asparagus, cabbage, snow peas, [place] sprouts, broccoli, sugar snap peas, mushrooms, celery, and cauliflower. Onions, garlic, leeks, and the white part of scallions.  Grains  Wheat, including kamut, durum, and semolina. Barley and bulgur. Couscous. Wheat-based cereals. Wheat noodles, bread, crackers, and pastries.  Meats and " other proteins  Fried or fatty meat. Sausage. Cashews and pistachios. Soybeans, baked beans, black beans, chickpeas, kidney beans, anthony beans, navy beans, lentils, black-eyed peas, and split peas.  Dairy  Milk, yogurt, ice cream, and soft cheese. Cream and sour cream. Milk-based sauces. Custard. Buttermilk. Soy milk.  Seasoning and other foods  Any sugar-free gum or candy. Foods that contain artificial sweeteners such as sorbitol, mannitol, isomalt, or xylitol. Foods that contain honey, high-fructose corn syrup, or agave. Bouillon, vegetable stock, beef stock, and chicken stock. Garlic and onion powder. Condiments made with onion, such as hummus, chutney, pickles, relish, salad dressing, and salsa. Tomato paste.  Beverages  Chicory-based drinks. Coffee substitutes. Chamomile tea. Fennel tea. Sweet or fortified arlene such as port or vito. Diet soft drinks made with isomalt, mannitol, maltitol, sorbitol, or xylitol. Apple, pear, and liliana juice. Juices with high-fructose corn syrup.  The items listed above may not be a complete list of foods and beverages you should avoid. Contact a dietitian for more information.  Summary  FODMAP stands for fermentable oligosaccharides, disaccharides, monosaccharides, and polyols. These are sugars that are hard for some people to digest.  A low-FODMAP eating plan is a short-term diet that helps to ease symptoms of certain bowel diseases.  The eating plan usually lasts up to 6 weeks. After that, high-FODMAP foods are reintroduced gradually and one at a time. This can help you find out which foods may be causing symptoms.  A low-FODMAP eating plan can be complicated. It is best to work with a dietitian who has experience with this type of plan.  This information is not intended to replace advice given to you by your health care provider. Make sure you discuss any questions you have with your health care provider.  Document Revised: 05/06/2021 Document Reviewed: 05/06/2021  Amarjit  Patient Education © 2022 [employer]

## 2025-01-09 NOTE — PROGRESS NOTES
Follow Up Note     Date: 2025   Patient Name: Robinson Tovar  MRN: [id number]  : 1965     Primary Care Provider: Eleonora Garcia APRN     Chief Complaint   Patient presents with    Irritable Bowel Syndrome     2025  History of Present Illness  The patient is a 59-year-old male who is here for a follow-up of IBS.    He reports persistent diarrhea, which he attributes to his medication regimen. He has been adhering to a twice-daily dosage of Colestid but has ran out and is not taking anything for his diarrhea.  He inquires about the possibility of continuing Nexium, which he finds beneficial in managing his acid reflux. He does not require Bentyl as he infrequently experiences cramping and denies any abdominal pain. He denies any nausea or vomiting. He denies any GI bleeding.      Interval History:  2024  Robinson Tovar is a 59 y.o. male who is here today for follow up for diarrhea. Diarrhea is unchanged. He tried taking Creon but it didn't help. He is not able to take it as directed due to his work schedule, he only took it when he remembered to take it.  He had the CT scan at [facility], but unfortunately we did not get those results. No GI bleeding.      3/16/2021  He has a chronic episodic diarrhea for about 15-20 yrs. He normally gets one soft bowel movement but intermittently gets episodes of diarhea. He gets these episodes once in a week or so, last for 3-4 days and clears off. Associated abdominal rumbling. He feels like full all day without any hunger. He will get lot of gas and benching.      This patient deny any abdominal pain, no recent change in bowel habit, hematochezia or melena.  Weight is stable. Pt denies nausea, vomiting or odynophagia or dysphagia. There is  history of occasional acid reflux. There is no history of anemia. No prior recent history of EGD or colonoscopy. Family history of colon cancer- Dad at the age of 70. No history of any abdominal  surgery. Denies alcohol abuse or cigarette smoking. He quit chewing tobacco.      He has a diabetes mellitus with noncompliance with medication.  He is currently on insulin.     Subjective      Past Medical History:   Diagnosis Date    Diabetes mellitus     Disease of thyroid gland     Early satiety     Elevated cholesterol     Gastroparesis     GERD (gastroesophageal reflux disease)     Heart murmur     as a child    Hyperlipidemia     Hypertension      Past Surgical History:   Procedure Laterality Date    COLONOSCOPY      COLONOSCOPY N/A 4/23/2021    Procedure: COLONOSCOPY WITH BIOPSIES, AND RESOLUTION CLIPS;  Surgeon: Cheko Rogel MD;  Location: [facility] ENDOSCOPY;  Service: Gastroenterology;  Laterality: N/A;    COLONOSCOPY N/A 10/17/2022    Procedure: COLONOSCOPY with polypectomy ;  Surgeon: Cheko Rogel MD;  Location: [facility] ENDOSCOPY;  Service: Gastroenterology;  Laterality: N/A;    COLONOSCOPY N/A 9/11/2024    Procedure: COLONOSCOPY WITH BIOPSY AND POLYPECTOMY;  Surgeon: Cheko Rogel MD;  Location: [facility] ENDOSCOPY;  Service: Gastroenterology;  Laterality: N/A;    ENDOSCOPY N/A 4/23/2021    Procedure: ESOPHAGOGASTRODUODENOSCOPY WITH BIOPSIES;  Surgeon: Cheko Rogel MD;  Location: [facility] ENDOSCOPY;  Service: Gastroenterology;  Laterality: N/A;    EYE SURGERY Right 2017    artificial lens placed    KIDNEY STONE SURGERY       Family History   Problem Relation Age of Onset    Diabetes Father     Colon cancer Father 74    Cirrhosis Neg Hx     Liver disease Neg Hx     Liver cancer Neg Hx      Social History     Socioeconomic History    Marital status:    Tobacco Use    Smoking status: Never    Smokeless tobacco: Former     Types: Chew     Quit date: 4/22/2020   Vaping Use    Vaping status: Never Used   Substance and Sexual Activity    Alcohol use: Not Currently    Drug use: Never    Sexual activity: Defer       Current Outpatient Medications:     amLODIPine (NORVASC) 10  MG tablet, Take 1 tablet by mouth Daily., Disp: , Rfl:     colestipol (COLESTID) 1 g tablet, Take 2 tablets by mouth 2 (Two) Times a Day., Disp: 360 tablet, Rfl: 3    Continuous Blood Gluc Sensor (Dexcom G7 Sensor) misc, CHANGE every 10 DAYS AS DIRECTED, Disp: , Rfl:     dicyclomine (BENTYL) 20 MG tablet, Take 1 tablet by mouth 3 (Three) Times a Day As Needed for Abdominal Cramping., Disp: 45 tablet, Rfl: 2    ergocalciferol (ERGOCALCIFEROL) 1.25 MG (36946 UT) capsule, Take 1 capsule by mouth 1 (One) Time Per Week., Disp: , Rfl:     esomeprazole (nexIUM) 20 MG capsule, Take 1 capsule by mouth Every Morning Before Breakfast., Disp: 90 capsule, Rfl: 3    insulin aspart (novoLOG FLEXPEN) 100 UNIT/ML solution pen-injector sc pen, Inject  under the skin into the appropriate area as directed 3 (Three) Times a Day With Meals. 10-15 units sc with Meals (depends on blood sugar per pt report).   Per pharmacy @ [facility]:  Max dose 70 units TID with meals., Disp: , Rfl:     insulin detemir (LEVEMIR) 100 UNIT/ML injection, Inject 100 Units under the skin into the appropriate area as directed Every Night., Disp: , Rfl:     lisinopril (PRINIVIL,ZESTRIL) 40 MG tablet, Take 1 tablet by mouth Daily., Disp: , Rfl:     metoprolol succinate XL (TOPROL-XL) 100 MG 24 hr tablet, Take 1 tablet by mouth Daily., Disp: , Rfl:     saccharomyces boulardii (Florastor) 250 MG capsule, Take 1 capsule by mouth 2 (Two) Times a Day., Disp: 60 capsule, Rfl: 5    Allergies   Allergen Reactions    Sulfa Antibiotics Anaphylaxis    Apidra [Insulin Glulisine] Nausea And Vomiting    Basaglar Kwikpen [Insulin Glargine] Nausea And Vomiting     Nausea, vomiting, diarrhea    Humalog [Insulin Lispro] GI Intolerance    Tresiba [Insulin Degludec] Diarrhea     Diarrhea and stomach cramps    Xultophy [Insulin Degludec-Liraglutide] Diarrhea     Diarrhea and stomach cramps    Penicillins Other (See Comments)     Pt unsure of reaction       The following  portions of the patient's history were reviewed and updated as appropriate: allergies, current medications, past family history, past medical history, past social history, past surgical history and problem list.  Objective     Physical Exam  Vitals and nursing note reviewed.   Constitutional:       General: He is not in acute distress.     Appearance: Normal appearance. He is well-developed.   HENT:      Head: Normocephalic and atraumatic.      Mouth/Throat:      Mouth: Mucous membranes are not pale, not dry and not cyanotic.   Eyes:      General: Lids are normal.   Neck:      Trachea: Trachea normal.   Cardiovascular:      Rate and Rhythm: Normal rate.   Pulmonary:      Effort: Pulmonary effort is normal. No respiratory distress.      Breath sounds: Normal breath sounds.   Abdominal:      Tenderness: There is no abdominal tenderness.   Skin:     General: Skin is warm and dry.   Neurological:      Mental Status: He is alert and oriented to person, place, and time.   Psychiatric:         Mood and Affect: Mood normal.         Speech: Speech normal.         Behavior: Behavior normal. Behavior is cooperative.       Vitals:    01/09/25 1536   BP: 140/80   Pulse: 84   SpO2: 98%   Weight: 93.4 kg (206 lb)     Body mass index is 35.36 kg/m².     Results Review:   I reviewed the patient's new clinical results.    No visits with results within 90 Day(s) from this visit.   Latest known visit with results is:   Admission on 09/11/2024, Discharged on 09/11/2024   Component Date Value Ref Range Status    Glucose 09/11/2024 107  70 - 130 mg/dL Final    Serial Number: [id number]:  [id number]    Reference Lab Report 09/11/2024    Final                    Value:[facility]  [address] [place] [address]  Phone: [phone] or [phone]  Fax: [phone]  Ramon Velarde M.D., Medical Director    PATIENT NAME                                     LABORATORY NO.  EHSAN MANCINI                                 [id number]  [id number]                                 AGE                    SEX   SSN              CLIENT REF #  [facility]                    59        1965           [id number]      [id number]    [address] [place] [address] BY-PASS                        REQUESTING MTERRY.           ATTENDING M.D.         COPY TO.  [place] [address]                         JAGANNA  DATE COLLECTED            DATE RECEIVED          DATE REPORTED  2024    DIAGNOSIS:  A.     TERMINAL ILEUM BIOPSY:  Small bowel mucosa with no significant pathologic abnormality  B.                               CECUM BIOPSY, AND ASCENDING:  Colonic mucosa with no significant pathologic abnormality  C.     TRANSVERSE COLON BIOPSY:  Colonic mucosa with no significant pathologic abnormality  D.     DESCENDING COLON BIOPSY:  Colonic mucosa with no significant pathologic abnormality  E.     TRANSVERSE COLON POLYP:  Tubular adenoma  No high grade dysplasia identified  F.     SIGMOID COLON BIOPSY:  Colonic mucosa with no significant pathologic abnormality  G.     RECTAL BIOPSY:  Colonic mucosa with no significant pathologic abnormality       REVIEWED, DIAGNOSED AND ELECTRONICALLY  SIGNED BY:    Radha Mcclellan D.O., F.C.A.P.  CPT CODES:  88305x7        Dated 2024 hemoglobin 16.1 hematocrit 47.2 platelet count 296 total bilirubin 0.4 alkaline phosphatase 102 AST 25 ALT 36 TSH 4.14, pancreatic elastase 90, fecal calprotectin 51    CTAP with contrast 3/22/2024  Moderate wall thickening of the distal rectum, may be inflammatory or neoplastic.  Gallstone with gallbladder wall thickening, cholecystitis is not excluded.  If indicated, hepatobiliary scan may be helpful.  Basilar pulmonary nodules, may represent granulomas versus metastases.  This can be further evaluated with follow-up CT  in 3 to 6 months.  Sclerosis in the medial left iliac bone, may represent bone island versus sclerotic metastases.    CT Chest Without Contrast Diagnostic     Result Date: 10/17/2024  Impression: 1. Stable bilateral pulmonary nodules. Recommend 6-month follow-up until 2-year stability can be documented.     EGD and colonoscopy were completed by Dr. Rogel on 4/23/2021:  - The oropharynx was normal.  - The Z-line was regular and was found 36 cm from the incisors.  - No gross lesions were noted in the entire esophagus.  - Patchy mildly erythematous mucosa without bleeding was found on the posterior wall of the stomach and in the gastric antrum. Biopsies were taken with a cold forceps for Helicobacter pylori testing. Biopsies were taken with a cold forceps for Helicobacter pylori testing.  - The duodenal bulb, first portion of the duodenum, second portion of the duodenum and third portion of the duodenum were normal. Biopsies for histology were taken with a cold forceps for evaluation of celiac disease.  - The perianal and digital rectal examinations were normal.  - A 5 mm polyp was found in the proximal descending colon. The polyp was sessile. The polyp was removed with a cold snare. Resection and retrieval were complete.  - A 15 to 16 mm polyp was found in the rectum. The polyp was flat and Lyla classification IIb (flat). Preparations were made for mucosal resection. 5 mL of orise was injected with adequate lift of the lesion from the muscularis propria.  Snare mucosal resection with suction (via the working channel) retrieval was performed. A 15 x 20 mm area was resected. Resection and retrieval were complete. There was no bleeding during the procedure. To close a defect after polypectomy, three hemostatic clips were successfully placed (MR conditional). There was no bleeding at the end of the procedure. Area was tattooed with an injection of 4 mL of Tanya ink.  - A few small-mouthed diverticula were found in the  sigmoid colon and ascending colon.  - Biopsies for histology were taken with a cold forceps from the right colon, left colon and transverse colon for evaluation of microscopic colitis.  - The terminal ileum appeared normal. Biopsies were taken with a cold forceps for histology for diarrhea.   - Pathology showed unremarkable duodenal mucosa with preserved villous architecture, reactive gastropathy, negative H. pylori, negative for intestinal metaplasia of the antral biopsy, random colon biopsies were unremarkable, descending colon polyp was tubular adenoma negative for high-grade dysplasia, rectal polyp was tubular adenoma.     Colonoscopy dated 10/17/2022 per Dr. Rogel  - One 6 mm polyp in the distal sigmoid colon, removed with a cold snare. Resected and retrieved.  - A tattoo was seen in the distal rectum. The tattoo site appeared normal.  - Two small 3- 4 mm polypoid area of mucosa at prior EMR site edge was found in the distal rectum removed with a cold snare. Resected and retrieved. Clinically hyperplastic.  - Diverticulosis in the sigmoid colon.  - The examined portion of the ileum was normal.  A.   SIGMOID COLON POLYP:   Tubular adenoma; negative for high-grade dysplasia.   B.   RECTAL POLYP:   Hyperplastic polyp.    Colonoscopy dated 9/11/2024 per Dr. Rogel  - Stool in the entire examined colon.  - Diverticulosis in the sigmoid colon, in the descending colon and at the hepatic flexure.  - One 5 mm polyp in the distal transverse colon, removed with a cold snare. Resected and retrieved.  - Non-bleeding internal hemorrhoids.  - A tattoo was seen in the distal rectum. The tattoo site appeared normal.  - Post EMR Scar in the distal rectum.  - The examined portion of the ileum was normal. Biopsied.  - Biopsies were taken with a cold forceps for histology in the rectum, in the sigmoid colon, in the descending colon, in the transverse colon, in the ascending colon and in the cecum.  A.     TERMINAL ILEUM  BIOPSY:  Small bowel mucosa with no significant pathologic abnormality  B.     CECUM BIOPSY, AND ASCENDING:  Colonic mucosa with no significant pathologic abnormality  C.     TRANSVERSE COLON BIOPSY:  Colonic mucosa with no significant pathologic abnormality  D.     DESCENDING COLON BIOPSY:  Colonic mucosa with no significant pathologic abnormality  E.     TRANSVERSE COLON POLYP:  Tubular adenoma  No high grade dysplasia identified  F.     SIGMOID COLON BIOPSY:  Colonic mucosa with no significant pathologic abnormality  G.     RECTAL BIOPSY:  Colonic mucosa with no significant pathologic abnormality     Assessment / Plan      1. Diarrhea, unspecified type  2. Lower abdominal pain  3. Irritable bowel syndrome with diarrhea  4. Pancreatic insufficiency  Diarrhea is unchanged.  Denies any abdominal pain, nausea or vomiting.  Denies any GI bleeding.  He is not taking anything for his symptoms at this time, he ran out of the Colestid.  He does not remember if it helped.  He tried Creon in the past but only took it when he remembered, he does not think it helped.  He was prescribed Xifaxan for 2 weeks at his last office visit, but it did not change his symptoms. TSH normal.  Celiac panel negative.  Fecal calprotectin normal.  Pancreatic elastase decreased at 90. Colonoscopy and EGD 4/23/2021 with colon polyps removed, biopsies unremarkable. duodenal biopsies with preserved villous architecture, no evidence of celiac.  Repeat colonoscopy 10/17/2022 with polyp removed, otherwise unremarkable.  CTAP with contrast dated 3/22/2024 with moderate wall thickening of the distal rectum.  Follow-up colonoscopy dated 9/11/2024 with stool in the entire examined colon, no evidence of colitis or ileitis.  Tattoo in the distal rectum, site appeared normal.  Terminal ileum and random biopsies unremarkable.  Suspect IBS-D, may have overlapping pancreatic insufficiency.  Low FODMAP diet-avoid all dairy. Advised to avoid chewing  tobacco.  Metamucil or fiber Gummies daily.  Colestid 1 g 2 tablets twice a day.  Adjust dose to have 1-2 soft bowel movements per day.  Bentyl and Imodium as needed.  Florastor 1 p.o. twice a day.  Small bowel PillCam-patient declines at this time.  He will call back if he wants to schedule.    - colestipol (COLESTID) 1 g tablet; Take 2 tablets by mouth 2 (Two) Times a Day.  Dispense: 360 tablet; Refill: 3  - saccharomyces boulardii (Florastor) 250 MG capsule; Take 1 capsule by mouth 2 (Two) Times a Day.  Dispense: 60 capsule; Refill: 5    5. Gastroesophageal reflux disease without esophagitis  Reflux reasonably controlled with low-dose Nexium once a day.  Continue same.  Denies difficulty swallowing.  EGD dated 4/23/2021 unremarkable, no Hair's.  Antireflux measures.    - esomeprazole (nexIUM) 20 MG capsule; Take 1 capsule by mouth Every Morning Before Breakfast.  Dispense: 90 capsule; Refill: 3    6. Adenomatous polyp of colon, unspecified part of colon  7. Family history of colon cancer in father  Colonoscopy in 2021 with 15 to 16 mm rectal polyp removed, tubular adenoma without dysplasia. Follow up colonoscopy dated 10/17/2022 with 2 polyps removed, one tubular adenoma without dysplasia in sigmoid colon, one polyp removed from prior EMR site-hyperplastic.  Colonoscopy dated 9/11/2024 with stool in the entire examined colon, 1 polyp removed, tubular adenoma without dysplasia.  Post EMR scar in the distal rectum noted.  Site appeared normal.There is a family history of colon cancer in his father diagnosed around age 74.  Colonoscopy for high risk surveillance in 5 years, September 2029.    8. Calculus of gallbladder  CTAP with contrast 3/22/2024 with gallstone with gallbladder wall thickening noted.  Follow-up chest CT without contrast dated 10/15/2024 with cholelithiasis noted.  Patient denies any abdominal pain, nausea or vomiting.  There is no history of elevated liver enzymes in the past.  Patient states  he had lab work at his PCP office about 1 month ago.  Will call PCP office to obtain copies of recent lab work.    Patient Instructions   Antireflux measures: Avoid fried, fatty foods, alcohol, chocolate, coffee, tea,  soft drinks, peppermint and spearmint, spicy foods, tomatoes and tomato based foods, onions, peppers, and smoking.   Other antireflux measures include weight reduction if overweight, avoiding tight clothing around the abdomen, elevating the head of the bed 6 inches with blocks under the head board, and don't drink or eat before going to bed and avoid lying down immediately after meals.  Avoid vaping/smoking.  Esomeprazole 20 mg 1 by mouth in the am 30 minutes before breakfast.  High fiber, low fat diet with liberal water intake.   Metamucil 1 packet/scoop daily or fiber gummies/fiber capsules 2-4 per day.   Colestid 1 gm 2 po twice a day. Decrease to once a day if having constipation.   Bentyl 20 mg 1 po 3 times per day as needed for lower abdominal pain.  Imodium 2 mg 1/2-1 caplet 1-2 times per day as needed for >3 episodes of diarrhea.   Low FODMAP diet - Avoid dairy. May use lactose free/dairy free alternatives such as almond milk, oat milk, etc.  Colonoscopy for surveillance in 5 years, September 2029.  Discussed Pillcam-patient wants to wait for now. He will call back if he wants to schedule.   Will call to obtain copies of recent labs.   Follow up: 6 months         Low-FODMAP Eating Plan  FODMAP stands for fermentable oligosaccharides, disaccharides, monosaccharides, and polyols. These are sugars that are hard for some people to digest. A low-FODMAP eating plan may help some people who have irritable bowel syndrome (IBS) and certain other bowel (intestinal) diseases to manage their symptoms.  This meal plan can be complicated to follow. Work with a diet and nutrition specialist (dietitian) to make a low-FODMAP eating plan that is right for you. A dietitian can help make sure that you get enough  nutrition from this diet.  What are tips for following this plan?  Reading food labels  Check labels for hidden FODMAPs such as:  High-fructose syrup.  Honey.  Agave.  Natural fruit flavors.  Onion or garlic powder.  Choose low-FODMAP foods that contain 3-4 grams of fiber per serving.  Check food labels for serving sizes. Eat only one serving at a time to make sure FODMAP levels stay low.  Shopping  Shop with a list of foods that are recommended on this diet and make a meal plan.  Meal planning  Follow a low-FODMAP eating plan for up to 6 weeks, or as told by your health care provider or dietitian.  To follow the eating plan:  Eliminate high-FODMAP foods from your diet completely. Choose only low-FODMAP foods to eat. You will do this for 2-6 weeks.  Gradually reintroduce high-FODMAP foods into your diet one at a time. Most people should wait a few days before introducing the next new high-FODMAP food into their meal plan. Your dietitian can recommend how quickly you may reintroduce foods.  Keep a daily record of what and how much you eat and drink. Make note of any symptoms that you have after eating.  Review your daily record with a dietitian regularly to identify which foods you can eat and which foods you should avoid.  General tips  Drink enough fluid each day to keep your urine pale yellow.  Avoid processed foods. These often have added sugar and may be high in FODMAPs.  Avoid most dairy products, whole grains, and sweeteners.  Work with a dietitian to make sure you get enough fiber in your diet.  Avoid high FODMAP foods at meals to manage symptoms.     Recommended foods  Fruits  Bananas, oranges, tangerines, kelly, limes, blueberries, raspberries, strawberries, grapes, cantaloupe, honeydew melon, kiwi, papaya, passion fruit, and pineapple. Limited amounts of dried cranberries, banana chips, and shredded coconut.  Vegetables  Eggplant, zucchini, cucumber, peppers, green beans, bean sprouts, lettuce, arugula,  "kale, Swiss chard, spinach, mahesh greens, bok estephania, summer squash, potato, and tomato. Limited amounts of corn, carrot, and sweet potato. Green parts of scallions.  Grains  Gluten-free grains, such as rice, oats, buckwheat, quinoa, corn, polenta, and millet. Gluten-free pasta, bread, or cereal. Rice noodles. Corn tortillas.  Meats and other proteins  Unseasoned beef, pork, poultry, or fish. Eggs. Springer. Tofu (firm) and tempeh. Limited amounts of nuts and seeds, such as almonds, walnuts, brazil nuts, pecans, peanuts, nut butters, pumpkin seeds, efren seeds, and sunflower seeds.  Dairy  Lactose-free milk, yogurt, and kefir. Lactose-free cottage cheese and ice cream. Non-dairy milks, such as almond, coconut, hemp, and rice milk. Non-dairy yogurt. Limited amounts of goat cheese, brie, mozzarella, parmesan, swiss, and other hard cheeses.  Fats and oils  Butter-free spreads. Vegetable oils, such as olive, canola, and sunflower oil.  Seasoning and other foods  Artificial sweeteners with names that do not end in \"ol,\" such as aspartame, saccharine, and stevia. Maple syrup, white table sugar, raw sugar, brown sugar, and molasses. Mayonnaise, soy sauce, and tamari. Fresh basil, coriander, parsley, rosemary, and thyme.  Beverages  Water and mineral water. Sugar-sweetened soft drinks. Small amounts of orange juice or cranberry juice. Black and green tea. Most dry arlene. Coffee.  The items listed above may not be a complete list of foods and beverages you can eat. Contact a dietitian for more information.     Foods to avoid  Fruits  Fresh, dried, and juiced forms of apple, pear, watermelon, peach, plum, cherries, apricots, blackberries, boysenberries, figs, nectarines, and liliana. Avocado.  Vegetables  Chicory root, artichoke, asparagus, cabbage, snow peas, [place] sprouts, broccoli, sugar snap peas, mushrooms, celery, and cauliflower. Onions, garlic, leeks, and the white part of scallions.  Grains  Wheat, including kamut, " durum, and semolina. Barley and bulgur. Couscous. Wheat-based cereals. Wheat noodles, bread, crackers, and pastries.  Meats and other proteins  Fried or fatty meat. Sausage. Cashews and pistachios. Soybeans, baked beans, black beans, chickpeas, kidney beans, anthony beans, navy beans, lentils, black-eyed peas, and split peas.  Dairy  Milk, yogurt, ice cream, and soft cheese. Cream and sour cream. Milk-based sauces. Custard. Buttermilk. Soy milk.  Seasoning and other foods  Any sugar-free gum or candy. Foods that contain artificial sweeteners such as sorbitol, mannitol, isomalt, or xylitol. Foods that contain honey, high-fructose corn syrup, or agave. Bouillon, vegetable stock, beef stock, and chicken stock. Garlic and onion powder. Condiments made with onion, such as hummus, chutney, pickles, relish, salad dressing, and salsa. Tomato paste.  Beverages  Chicory-based drinks. Coffee substitutes. Chamomile tea. Fennel tea. Sweet or fortified arlene such as port or vito. Diet soft drinks made with isomalt, mannitol, maltitol, sorbitol, or xylitol. Apple, pear, and liliana juice. Juices with high-fructose corn syrup.  The items listed above may not be a complete list of foods and beverages you should avoid. Contact a dietitian for more information.  Summary  FODMAP stands for fermentable oligosaccharides, disaccharides, monosaccharides, and polyols. These are sugars that are hard for some people to digest.  A low-FODMAP eating plan is a short-term diet that helps to ease symptoms of certain bowel diseases.  The eating plan usually lasts up to 6 weeks. After that, high-FODMAP foods are reintroduced gradually and one at a time. This can help you find out which foods may be causing symptoms.  A low-FODMAP eating plan can be complicated. It is best to work with a dietitian who has experience with this type of plan.  This information is not intended to replace advice given to you by your health care provider. Make sure you  discuss any questions you have with your health care provider.  Document Revised: 05/06/2021 Document Reviewed: 05/06/2021  Elsevier Patient Education © 2022 [employer]     KENAN Medrano  1/9/2025    Please note that portions of this note were completed with a voice recognition program.     Patient or patient representative verbalized consent for the use of Ambient Listening during the visit with  KENAN Medrano for chart documentation. 1/9/2025  16:05 EST

## 2025-01-31 ENCOUNTER — OFFICE VISIT (OUTPATIENT)
Dept: PULMONOLOGY | Facility: CLINIC | Age: 60
End: 2025-01-31
Payer: COMMERCIAL

## 2025-01-31 VITALS
HEIGHT: 64 IN | SYSTOLIC BLOOD PRESSURE: 140 MMHG | WEIGHT: 203 LBS | DIASTOLIC BLOOD PRESSURE: 80 MMHG | OXYGEN SATURATION: 97 % | BODY MASS INDEX: 34.66 KG/M2 | HEART RATE: 73 BPM

## 2025-01-31 DIAGNOSIS — R91.8 LUNG NODULE, MULTIPLE: Primary | ICD-10-CM

## 2025-01-31 PROCEDURE — 99213 OFFICE O/P EST LOW 20 MIN: CPT | Performed by: NURSE PRACTITIONER

## 2025-01-31 RX ORDER — INSULIN GLARGINE 100 [IU]/ML
INJECTION, SOLUTION SUBCUTANEOUS
COMMUNITY
Start: 2025-01-14

## 2025-01-31 NOTE — PROGRESS NOTES
Follow Up Office Visit      Patient Name: Robinson Tovar    Chief Complaint:    Chief Complaint   Patient presents with    Abnormal Imaging       History of Present Illness: Robinson Tovar is a 59 y.o. male who is here today for follow up of lung nodules.  Since last visit, repeat chest CT scan showed stable findings.  He denies any respiratory symptoms.  No fevers, no hemoptysis, no unintended weight loss.    Supplemental Oxygen: No    Subjective      Review of Systems:  Review of Systems   Constitutional:  Negative for fever and unexpected weight change.   Respiratory:  Negative for cough, shortness of breath and wheezing.    Cardiovascular:  Negative for chest pain and leg swelling.        Past Medical History:   Past Medical History:   Diagnosis Date    Diabetes mellitus     Disease of thyroid gland     Early satiety     Elevated cholesterol     Gastroparesis     GERD (gastroesophageal reflux disease)     Heart murmur     as a child    Hyperlipidemia     Hypertension        Past Surgical History:   Past Surgical History:   Procedure Laterality Date    COLONOSCOPY      COLONOSCOPY N/A 4/23/2021    Procedure: COLONOSCOPY WITH BIOPSIES, AND RESOLUTION CLIPS;  Surgeon: Cheko Rogel MD;  Location: [facility] ENDOSCOPY;  Service: Gastroenterology;  Laterality: N/A;    COLONOSCOPY N/A 10/17/2022    Procedure: COLONOSCOPY with polypectomy ;  Surgeon: Cheko Rogel MD;  Location: [facility] ENDOSCOPY;  Service: Gastroenterology;  Laterality: N/A;    COLONOSCOPY N/A 9/11/2024    Procedure: COLONOSCOPY WITH BIOPSY AND POLYPECTOMY;  Surgeon: Cheko Rogel MD;  Location: [facility] ENDOSCOPY;  Service: Gastroenterology;  Laterality: N/A;    ENDOSCOPY N/A 4/23/2021    Procedure: ESOPHAGOGASTRODUODENOSCOPY WITH BIOPSIES;  Surgeon: Cheko Rogel MD;  Location: [facility] ENDOSCOPY;  Service: Gastroenterology;  Laterality: N/A;    EYE SURGERY Right 2017    artificial lens placed    KIDNEY STONE  SURGERY         Family History:   Family History   Problem Relation Age of Onset    Diabetes Father     Colon cancer Father 74    Cirrhosis Neg Hx     Liver disease Neg Hx     Liver cancer Neg Hx        Social History:   Social History     Socioeconomic History    Marital status:    Tobacco Use    Smoking status: Never     Passive exposure: Never    Smokeless tobacco: Former     Types: Chew     Quit date: 4/22/2020   Vaping Use    Vaping status: Never Used   Substance and Sexual Activity    Alcohol use: Not Currently    Drug use: Never    Sexual activity: Defer       Current Medications:     Current Outpatient Medications:     amLODIPine (NORVASC) 10 MG tablet, Take 1 tablet by mouth Daily., Disp: , Rfl:     colestipol (COLESTID) 1 g tablet, Take 2 tablets by mouth 2 (Two) Times a Day., Disp: 360 tablet, Rfl: 3    Continuous Blood Gluc Sensor (Dexcom G7 Sensor) misc, CHANGE every 10 DAYS AS DIRECTED, Disp: , Rfl:     dicyclomine (BENTYL) 20 MG tablet, Take 1 tablet by mouth 3 (Three) Times a Day As Needed for Abdominal Cramping., Disp: 45 tablet, Rfl: 2    ergocalciferol (ERGOCALCIFEROL) 1.25 MG (04597 UT) capsule, Take 1 capsule by mouth 1 (One) Time Per Week., Disp: , Rfl:     esomeprazole (nexIUM) 20 MG capsule, Take 1 capsule by mouth Every Morning Before Breakfast., Disp: 90 capsule, Rfl: 3    insulin detemir (LEVEMIR) 100 UNIT/ML injection, Inject 100 Units under the skin into the appropriate area as directed Every Night., Disp: , Rfl:     Lantus 100 UNIT/ML injection, INJECT 110 UNITS SUBCUTANEOUSLY DAILY (MAY TITRATE TO MAX DAILY DOSE  UNITS), Disp: , Rfl:     lisinopril (PRINIVIL,ZESTRIL) 40 MG tablet, Take 1 tablet by mouth Daily., Disp: , Rfl:     metoprolol succinate XL (TOPROL-XL) 100 MG 24 hr tablet, Take 1 tablet by mouth Daily., Disp: , Rfl:     insulin aspart (novoLOG FLEXPEN) 100 UNIT/ML solution pen-injector sc pen, Inject  under the skin into the appropriate area as directed 3  "(Three) Times a Day With Meals. 10-15 units sc with Meals (depends on blood sugar per pt report).   Per pharmacy @ [facility]:  Max dose 70 units TID with meals. (Patient not taking: Reported on 1/31/2025), Disp: , Rfl:     saccharomyces boulardii (Florastor) 250 MG capsule, Take 1 capsule by mouth 2 (Two) Times a Day., Disp: 60 capsule, Rfl: 5     Allergies:   Allergies   Allergen Reactions    Sulfa Antibiotics Anaphylaxis    Apidra [Insulin Glulisine] Nausea And Vomiting    Basaglar Kwikpen [Insulin Glargine] Nausea And Vomiting     Nausea, vomiting, diarrhea    Humalog [Insulin Lispro] GI Intolerance    Tresiba [Insulin Degludec] Diarrhea     Diarrhea and stomach cramps    Xultophy [Insulin Degludec-Liraglutide] Diarrhea     Diarrhea and stomach cramps    Penicillins Other (See Comments)     Pt unsure of reaction       Objective     Physical Exam:  Vital Signs:   Vitals:    01/31/25 1310   BP: 140/80   Pulse: 73   SpO2: 97%   Weight: 92.1 kg (203 lb)   Height: 162.6 cm (64\")     Body mass index is 34.84 kg/m².    Physical Exam  Vitals reviewed.   Constitutional:       General: He is not in acute distress.     Appearance: He is not toxic-appearing.   HENT:      Head: Normocephalic and atraumatic.      Mouth/Throat:      Mouth: Mucous membranes are moist.   Eyes:      Extraocular Movements: Extraocular movements intact.      Conjunctiva/sclera: Conjunctivae normal.   Cardiovascular:      Rate and Rhythm: Normal rate.      Heart sounds: Normal heart sounds.   Pulmonary:      Effort: Pulmonary effort is normal.      Breath sounds: Normal breath sounds.   Abdominal:      General: There is no distension.      Palpations: Abdomen is soft.   Musculoskeletal:         General: No swelling.      Cervical back: Neck supple.   Skin:     General: Skin is warm and dry.      Findings: No rash.   Neurological:      General: No focal deficit present.      Mental Status: He is alert and oriented to person, place, and time. "   Psychiatric:         Mood and Affect: Mood normal.         Behavior: Behavior normal.         Results Review:   March 2024 CT abdomen and pelvis showed several nodules in the lung bases measuring up to 6 mm on the right and 6 mm on the left.    October 2024 chest CT scan showed stable bilateral pulmonary nodules compared to March 2024 study.    Assessment / Plan      Assessment/Plan:   Diagnoses and all orders for this visit:    1. Lung nodule, multiple (Primary)  -     CT Chest Without Contrast Diagnostic; Future    Most recent chest CT scan results were reviewed and discussed with patient.  Bilateral lower lobe lung nodule stable compared to March 2024 study without any new or suspicious nodules identified.  A 6-month repeat chest CT scan will be arranged.  If stability is noted at that time, will thereafter plan for a 1 year repeat chest CT scan to ensure 2 years of stability.    Follow Up:   Return in about 6 months (around 7/31/2025).  The patient was counseled on diagnostic results, risks and benefits of treatment options, risk factor modifications and the importance of treatment compliance. The patient was advised to contact the clinic with concerns or worsening symptoms.     KENAN Love   [facility]     This document has been electronically signed by KENAN Love  January 31, 2025

## 2025-04-21 ENCOUNTER — HOSPITAL ENCOUNTER (OUTPATIENT)
Dept: CT IMAGING | Facility: HOSPITAL | Age: 60
Discharge: HOME | End: 2025-04-21
Admitting: NURSE PRACTITIONER
Payer: COMMERCIAL

## 2025-04-21 DIAGNOSIS — R91.8 LUNG NODULE, MULTIPLE: ICD-10-CM

## 2025-04-21 PROCEDURE — 71250 CT THORAX DX C-: CPT

## 2025-07-09 ENCOUNTER — OFFICE VISIT (OUTPATIENT)
Dept: GASTROENTEROLOGY | Facility: CLINIC | Age: 60
End: 2025-07-09
Payer: COMMERCIAL

## 2025-07-09 ENCOUNTER — LAB (OUTPATIENT)
Dept: LAB | Facility: HOSPITAL | Age: 60
End: 2025-07-09
Payer: COMMERCIAL

## 2025-07-09 VITALS
OXYGEN SATURATION: 99 % | BODY MASS INDEX: 35.02 KG/M2 | DIASTOLIC BLOOD PRESSURE: 72 MMHG | HEART RATE: 87 BPM | WEIGHT: 204 LBS | SYSTOLIC BLOOD PRESSURE: 122 MMHG

## 2025-07-09 DIAGNOSIS — Z80.0 FAMILY HISTORY OF COLON CANCER IN FATHER: ICD-10-CM

## 2025-07-09 DIAGNOSIS — Z86.0100 PERSONAL HISTORY OF COLON POLYPS, UNSPECIFIED: Chronic | ICD-10-CM

## 2025-07-09 DIAGNOSIS — K92.9 FUNCTIONAL GASTROINTESTINAL DISORDER: Chronic | ICD-10-CM

## 2025-07-09 DIAGNOSIS — R19.7 DIARRHEA, UNSPECIFIED TYPE: Chronic | ICD-10-CM

## 2025-07-09 DIAGNOSIS — K21.9 GASTROESOPHAGEAL REFLUX DISEASE WITHOUT ESOPHAGITIS: Chronic | ICD-10-CM

## 2025-07-09 DIAGNOSIS — R10.30 LOWER ABDOMINAL PAIN: Chronic | ICD-10-CM

## 2025-07-09 DIAGNOSIS — K80.20 CALCULUS OF GALLBLADDER WITHOUT CHOLECYSTITIS WITHOUT OBSTRUCTION: Chronic | ICD-10-CM

## 2025-07-09 DIAGNOSIS — K58.0 IRRITABLE BOWEL SYNDROME WITH DIARRHEA: Primary | Chronic | ICD-10-CM

## 2025-07-09 DIAGNOSIS — K86.89 PANCREATIC INSUFFICIENCY: Chronic | ICD-10-CM

## 2025-07-09 LAB
DEPRECATED RDW RBC AUTO: 45.1 FL (ref 37–54)
ERYTHROCYTE [DISTWIDTH] IN BLOOD BY AUTOMATED COUNT: 13.9 % (ref 12.3–15.4)
HCT VFR BLD AUTO: 39.2 % (ref 37.5–51)
HGB BLD-MCNC: 13.6 G/DL (ref 13–17.7)
MCH RBC QN AUTO: 31.1 PG (ref 26.6–33)
MCHC RBC AUTO-ENTMCNC: 34.7 G/DL (ref 31.5–35.7)
MCV RBC AUTO: 89.7 FL (ref 79–97)
PLATELET # BLD AUTO: 289 10*3/MM3 (ref 140–450)
PMV BLD AUTO: 10.5 FL (ref 6–12)
RBC # BLD AUTO: 4.37 10*6/MM3 (ref 4.14–5.8)
WBC NRBC COR # BLD AUTO: 6.73 10*3/MM3 (ref 3.4–10.8)

## 2025-07-09 PROCEDURE — 86003 ALLG SPEC IGE CRUDE XTRC EA: CPT

## 2025-07-09 PROCEDURE — 82784 ASSAY IGA/IGD/IGG/IGM EACH: CPT

## 2025-07-09 PROCEDURE — 80053 COMPREHEN METABOLIC PANEL: CPT

## 2025-07-09 PROCEDURE — 86364 TISS TRNSGLTMNASE EA IG CLAS: CPT

## 2025-07-09 PROCEDURE — 82785 ASSAY OF IGE: CPT

## 2025-07-09 PROCEDURE — 86008 ALLG SPEC IGE RECOMB EA: CPT

## 2025-07-09 PROCEDURE — 36415 COLL VENOUS BLD VENIPUNCTURE: CPT

## 2025-07-09 PROCEDURE — 85027 COMPLETE CBC AUTOMATED: CPT

## 2025-07-09 PROCEDURE — 99214 OFFICE O/P EST MOD 30 MIN: CPT | Performed by: NURSE PRACTITIONER

## 2025-07-09 RX ORDER — PANTOPRAZOLE SODIUM 20 MG/1
TABLET, DELAYED RELEASE ORAL
Qty: 90 TABLET | Refills: 3 | Status: SHIPPED | OUTPATIENT
Start: 2025-07-09

## 2025-07-09 NOTE — PATIENT INSTRUCTIONS
Antireflux measures: Avoid fried, fatty foods, alcohol, chocolate, coffee, tea,  soft drinks, peppermint and spearmint, spicy foods, tomatoes and tomato based foods, onions, peppers, and smoking.   Other antireflux measures include weight reduction if overweight, avoiding tight clothing around the abdomen, elevating the head of the bed 6 inches with blocks under the head board, and don't drink or eat before going to bed and avoid lying down immediately after meals.  Avoid vaping/smoking.  Pantoprazole 20 mg 1 by mouth in the am 30 minutes before breakfast.  Stop Nexium.  High fiber, low fat diet with liberal water intake.   Metamucil 1 packet/scoop daily or fiber gummies/fiber capsules 2-4 per day.    Bentyl 20 mg 1 po 3 times per day as needed for lower abdominal pain.  Imodium 2 mg 1/2-1 caplet 1-2 times per day as needed for diarrhea.   Low FODMAP diet - Avoid dairy. May use lactose free/dairy free alternatives such as almond milk, oat milk, etc.  Colonoscopy for surveillance in 5 years, September 2029.  Labs  Follow up: 6 months           Low-FODMAP Eating Plan  FODMAP stands for fermentable oligosaccharides, disaccharides, monosaccharides, and polyols. These are sugars that are hard for some people to digest. A low-FODMAP eating plan may help some people who have irritable bowel syndrome (IBS) and certain other bowel (intestinal) diseases to manage their symptoms.  This meal plan can be complicated to follow. Work with a diet and nutrition specialist (dietitian) to make a low-FODMAP eating plan that is right for you. A dietitian can help make sure that you get enough nutrition from this diet.  What are tips for following this plan?  Reading food labels  Check labels for hidden FODMAPs such as:  High-fructose syrup.  Honey.  Agave.  Natural fruit flavors.  Onion or garlic powder.  Choose low-FODMAP foods that contain 3-4 grams of fiber per serving.  Check food labels for serving sizes. Eat only one serving at a  time to make sure FODMAP levels stay low.  Shopping  Shop with a list of foods that are recommended on this diet and make a meal plan.  Meal planning  Follow a low-FODMAP eating plan for up to 6 weeks, or as told by your health care provider or dietitian.  To follow the eating plan:  Eliminate high-FODMAP foods from your diet completely. Choose only low-FODMAP foods to eat. You will do this for 2-6 weeks.  Gradually reintroduce high-FODMAP foods into your diet one at a time. Most people should wait a few days before introducing the next new high-FODMAP food into their meal plan. Your dietitian can recommend how quickly you may reintroduce foods.  Keep a daily record of what and how much you eat and drink. Make note of any symptoms that you have after eating.  Review your daily record with a dietitian regularly to identify which foods you can eat and which foods you should avoid.  General tips  Drink enough fluid each day to keep your urine pale yellow.  Avoid processed foods. These often have added sugar and may be high in FODMAPs.  Avoid most dairy products, whole grains, and sweeteners.  Work with a dietitian to make sure you get enough fiber in your diet.  Avoid high FODMAP foods at meals to manage symptoms.     Recommended foods  Fruits  Bananas, oranges, tangerines, kelly, limes, blueberries, raspberries, strawberries, grapes, cantaloupe, honeydew melon, kiwi, papaya, passion fruit, and pineapple. Limited amounts of dried cranberries, banana chips, and shredded coconut.  Vegetables  Eggplant, zucchini, cucumber, peppers, green beans, bean sprouts, lettuce, arugula, kale, Swiss chard, spinach, mahesh greens, bok estephania, summer squash, potato, and tomato. Limited amounts of corn, carrot, and sweet potato. Green parts of scallions.  Grains  Gluten-free grains, such as rice, oats, buckwheat, quinoa, corn, polenta, and millet. Gluten-free pasta, bread, or cereal. Rice noodles. Corn tortillas.  Meats and other  "proteins  Unseasoned beef, pork, poultry, or fish. Eggs. Springer. Tofu (firm) and tempeh. Limited amounts of nuts and seeds, such as almonds, walnuts, brazil nuts, pecans, peanuts, nut butters, pumpkin seeds, efren seeds, and sunflower seeds.  Dairy  Lactose-free milk, yogurt, and kefir. Lactose-free cottage cheese and ice cream. Non-dairy milks, such as almond, coconut, hemp, and rice milk. Non-dairy yogurt. Limited amounts of goat cheese, brie, mozzarella, parmesan, swiss, and other hard cheeses.  Fats and oils  Butter-free spreads. Vegetable oils, such as olive, canola, and sunflower oil.  Seasoning and other foods  Artificial sweeteners with names that do not end in \"ol,\" such as aspartame, saccharine, and stevia. Maple syrup, white table sugar, raw sugar, brown sugar, and molasses. Mayonnaise, soy sauce, and tamari. Fresh basil, coriander, parsley, rosemary, and thyme.  Beverages  Water and mineral water. Sugar-sweetened soft drinks. Small amounts of orange juice or cranberry juice. Black and green tea. Most dry arlene. Coffee.  The items listed above may not be a complete list of foods and beverages you can eat. Contact a dietitian for more information.     Foods to avoid  Fruits  Fresh, dried, and juiced forms of apple, pear, watermelon, peach, plum, cherries, apricots, blackberries, boysenberries, figs, nectarines, and liliana. Avocado.  Vegetables  Chicory root, artichoke, asparagus, cabbage, snow peas, Gibson sprouts, broccoli, sugar snap peas, mushrooms, celery, and cauliflower. Onions, garlic, leeks, and the white part of scallions.  Grains  Wheat, including kamut, durum, and semolina. Barley and bulgur. Couscous. Wheat-based cereals. Wheat noodles, bread, crackers, and pastries.  Meats and other proteins  Fried or fatty meat. Sausage. Cashews and pistachios. Soybeans, baked beans, black beans, chickpeas, kidney beans, anthony beans, navy beans, lentils, black-eyed peas, and split peas.  Dairy  Milk, yogurt, " ice cream, and soft cheese. Cream and sour cream. Milk-based sauces. Custard. Buttermilk. Soy milk.  Seasoning and other foods  Any sugar-free gum or candy. Foods that contain artificial sweeteners such as sorbitol, mannitol, isomalt, or xylitol. Foods that contain honey, high-fructose corn syrup, or agave. Bouillon, vegetable stock, beef stock, and chicken stock. Garlic and onion powder. Condiments made with onion, such as hummus, chutney, pickles, relish, salad dressing, and salsa. Tomato paste.  Beverages  Chicory-based drinks. Coffee substitutes. Chamomile tea. Fennel tea. Sweet or fortified arlene such as port or vito. Diet soft drinks made with isomalt, mannitol, maltitol, sorbitol, or xylitol. Apple, pear, and liliana juice. Juices with high-fructose corn syrup.  The items listed above may not be a complete list of foods and beverages you should avoid. Contact a dietitian for more information.  Summary  FODMAP stands for fermentable oligosaccharides, disaccharides, monosaccharides, and polyols. These are sugars that are hard for some people to digest.  A low-FODMAP eating plan is a short-term diet that helps to ease symptoms of certain bowel diseases.  The eating plan usually lasts up to 6 weeks. After that, high-FODMAP foods are reintroduced gradually and one at a time. This can help you find out which foods may be causing symptoms.  A low-FODMAP eating plan can be complicated. It is best to work with a dietitian who has experience with this type of plan.  This information is not intended to replace advice given to you by your health care provider. Make sure you discuss any questions you have with your health care provider.  Document Revised: 05/06/2021 Document Reviewed: 05/06/2021  Elsevier Patient Education © 2022 [employer]

## 2025-07-09 NOTE — PROGRESS NOTES
Follow Up Note     Date: 2025   Patient Name: Robinson Tovar  MRN: [id number]  : 1965     Primary Care Provider: Eleonora Garcia APRN     Chief Complaint   Patient presents with    Diarrhea     2025  History of Present Illness  The patient is a 60-year-old male who is here for a follow-up of diarrhea.     Diarrhea symptoms are unchanged.  He has found taking Imodium approximately once per day controls diarrhea better than Colestid, so he stopped taking the Colestid.  He states if he misses taking a dose of his blood pressure medications he does not have diarrhea. Nexium is not controlling his reflux as well as it did in the past, he would like to try something else.  No difficulty swallowing.  No nausea or vomiting.  He denies any GI bleeding.     Interval History:  2025  The patient is a 59-year-old male who is here for a follow-up of IBS.     He reports persistent diarrhea, which he attributes to his medication regimen. He has been adhering to a twice-daily dosage of Colestid but has ran out and is not taking anything for his diarrhea.  He inquires about the possibility of continuing Nexium, which he finds beneficial in managing his acid reflux. He does not require Bentyl as he infrequently experiences cramping and denies any abdominal pain. He denies any nausea or vomiting. He denies any GI bleeding.     3/16/2021  He has a chronic episodic diarrhea for about 15-20 yrs. He normally gets one soft bowel movement but intermittently gets episodes of diarhea. He gets these episodes once in a week or so, last for 3-4 days and clears off. Associated abdominal rumbling. He feels like full all day without any hunger. He will get lot of gas and benching.      This patient deny any abdominal pain, no recent change in bowel habit, hematochezia or melena.  Weight is stable. Pt denies nausea, vomiting or odynophagia or dysphagia. There is  history of occasional acid reflux. There is no history of  anemia. No prior recent history of EGD or colonoscopy. Family history of colon cancer- Dad at the age of 70. No history of any abdominal surgery. Denies alcohol abuse or cigarette smoking. He quit chewing tobacco.      He has a diabetes mellitus with noncompliance with medication.  He is currently on insulin.     Subjective      Past Medical History:   Diagnosis Date    Diabetes mellitus     Disease of thyroid gland     Early satiety     Elevated cholesterol     Gastroparesis     GERD (gastroesophageal reflux disease)     Heart murmur     as a child    Hyperlipidemia     Hypertension      Past Surgical History:   Procedure Laterality Date    COLONOSCOPY      COLONOSCOPY N/A 4/23/2021    Procedure: COLONOSCOPY WITH BIOPSIES, AND RESOLUTION CLIPS;  Surgeon: Cheko Rogel MD;  Location: [facility] ENDOSCOPY;  Service: Gastroenterology;  Laterality: N/A;    COLONOSCOPY N/A 10/17/2022    Procedure: COLONOSCOPY with polypectomy ;  Surgeon: Cheko Rogel MD;  Location: [facility] ENDOSCOPY;  Service: Gastroenterology;  Laterality: N/A;    COLONOSCOPY N/A 9/11/2024    Procedure: COLONOSCOPY WITH BIOPSY AND POLYPECTOMY;  Surgeon: Cheko Rogel MD;  Location: [facility] ENDOSCOPY;  Service: Gastroenterology;  Laterality: N/A;    ENDOSCOPY N/A 4/23/2021    Procedure: ESOPHAGOGASTRODUODENOSCOPY WITH BIOPSIES;  Surgeon: Cheko Rogel MD;  Location: [facility] ENDOSCOPY;  Service: Gastroenterology;  Laterality: N/A;    EYE SURGERY Right 2017    artificial lens placed    KIDNEY STONE SURGERY       Family History   Problem Relation Age of Onset    Diabetes Father     Colon cancer Father 74    Cirrhosis Neg Hx     Liver disease Neg Hx     Liver cancer Neg Hx      Social History     Socioeconomic History    Marital status:    Tobacco Use    Smoking status: Never     Passive exposure: Never    Smokeless tobacco: Former     Types: Chew     Quit date: 4/22/2020   Vaping Use    Vaping status: Never Used    Substance and Sexual Activity    Alcohol use: Not Currently    Drug use: Never    Sexual activity: Defer       Current Outpatient Medications:     amLODIPine (NORVASC) 10 MG tablet, Take 1 tablet by mouth Daily., Disp: , Rfl:     Continuous Blood Gluc Sensor (Dexcom G7 Sensor) misc, CHANGE every 10 DAYS AS DIRECTED, Disp: , Rfl:     dicyclomine (BENTYL) 20 MG tablet, Take 1 tablet by mouth 3 (Three) Times a Day As Needed for Abdominal Cramping., Disp: 45 tablet, Rfl: 2    ergocalciferol (ERGOCALCIFEROL) 1.25 MG (34262 UT) capsule, Take 1 capsule by mouth 1 (One) Time Per Week., Disp: , Rfl:     insulin aspart (novoLOG FLEXPEN) 100 UNIT/ML solution pen-injector sc pen, Inject  under the skin into the appropriate area as directed 3 (Three) Times a Day With Meals. 10-15 units sc with Meals (depends on blood sugar per pt report).   Per pharmacy @ [facility]:  Max dose 70 units TID with meals., Disp: , Rfl:     insulin detemir (LEVEMIR) 100 UNIT/ML injection, Inject 100 Units under the skin into the appropriate area as directed Every Night., Disp: , Rfl:     Lantus 100 UNIT/ML injection, INJECT 110 UNITS SUBCUTANEOUSLY DAILY (MAY TITRATE TO MAX DAILY DOSE  UNITS), Disp: , Rfl:     lisinopril (PRINIVIL,ZESTRIL) 40 MG tablet, Take 1 tablet by mouth Daily., Disp: , Rfl:     metoprolol succinate XL (TOPROL-XL) 100 MG 24 hr tablet, Take 1 tablet by mouth Daily., Disp: , Rfl:     pantoprazole (PROTONIX) 20 MG EC tablet, 1 po in the am 30 minutes before breakfast., Disp: 90 tablet, Rfl: 3    Allergies   Allergen Reactions    Sulfa Antibiotics Anaphylaxis    Apidra [Insulin Glulisine] Nausea And Vomiting    Basaglar Kwikpen [Insulin Glargine] Nausea And Vomiting     Nausea, vomiting, diarrhea    Humalog [Insulin Lispro] GI Intolerance    Tresiba [Insulin Degludec] Diarrhea     Diarrhea and stomach cramps    Xultophy [Insulin Degludec-Liraglutide] Diarrhea     Diarrhea and stomach cramps    Penicillins Other (See  Comments)     Pt unsure of reaction       The following portions of the patient's history were reviewed and updated as appropriate: allergies, current medications, past family history, past medical history, past social history, past surgical history and problem list.  Objective     Physical Exam  Vitals and nursing note reviewed.   Constitutional:       General: He is not in acute distress.     Appearance: Normal appearance. He is well-developed.   HENT:      Head: Normocephalic and atraumatic.      Mouth/Throat:      Mouth: Mucous membranes are not pale, not dry and not cyanotic.   Eyes:      General: Lids are normal.   Neck:      Trachea: Trachea normal.   Cardiovascular:      Rate and Rhythm: Normal rate.   Pulmonary:      Effort: Pulmonary effort is normal. No respiratory distress.      Breath sounds: Normal breath sounds.   Abdominal:      Tenderness: There is no abdominal tenderness.   Skin:     General: Skin is warm and dry.   Neurological:      Mental Status: He is alert and oriented to person, place, and time.   Psychiatric:         Mood and Affect: Mood normal.         Speech: Speech normal.         Behavior: Behavior normal. Behavior is cooperative.       Vitals:    07/09/25 1604   BP: 122/72   Pulse: 87   SpO2: 99%   Weight: 92.5 kg (204 lb)     Body mass index is 35.02 kg/m².     Results Review:   I reviewed the patient's new clinical results.    No visits with results within 90 Day(s) from this visit.   Latest known visit with results is:   Admission on 09/11/2024, Discharged on 09/11/2024   Component Date Value Ref Range Status    Glucose 09/11/2024 107  70 - 130 mg/dL Final    Serial Number: [id number]:  [id number]    Reference Lab Report 09/11/2024    Final                    Value:[facility]  [address] [place] [address]  Phone: [phone] or [phone]  Fax: [phone]  Ramon Velarde M.D., Medical Director    PATIENT NAME                                      LABORATORY NO.  [id number]   EHSAN BULLOCK                                [id number]  [id number]                                 AGE                    SEX   SSN              CLIENT REF #  [facility] CHRISTINE                    59        1965           [id number]      [id number]    [address] [place] [address] BY-PASS                        REQUESTING M.D.           ATTENDING M.D.         COPY TO.  [address]                                [place] [address]                         [facility]  DATE COLLECTED            DATE RECEIVED          DATE REPORTED  2024    DIAGNOSIS:  A.     TERMINAL ILEUM BIOPSY:  Small bowel mucosa with no significant pathologic abnormality  B.               CECUM BIOPSY, AND ASCENDING:  Colonic mucosa with no significant pathologic abnormality  C.     TRANSVERSE COLON BIOPSY:  Colonic mucosa with no significant pathologic abnormality  D.     DESCENDING COLON BIOPSY:  Colonic mucosa with no significant pathologic abnormality  E.     TRANSVERSE COLON POLYP:  Tubular adenoma  No high grade dysplasia identified  F.     SIGMOID COLON BIOPSY:  Colonic mucosa with no significant pathologic abnormality  G.     RECTAL BIOPSY:  Colonic mucosa with no significant pathologic abnormality        REVIEWED, DIAGNOSED AND ELECTRONICALLY  SIGNED BY:    Radha Mcclellan D.O., F.C.A.P.  CPT CODES:  88305x7        Dated 2024 hemoglobin 16.1 hematocrit 47.2 platelet count 296 total bilirubin 0.4 alkaline phosphatase 102 AST 25 ALT 36 TSH 4.14, pancreatic elastase 90, fecal calprotectin 51     Dated 2024 total bilirubin 0.8 alkaline phosphatase 75 ALT 26 AST 25    CTAP with contrast 3/22/2024  Moderate wall thickening of the distal rectum, may be inflammatory or neoplastic.  Gallstone with gallbladder wall thickening, cholecystitis is not excluded.  If indicated, hepatobiliary scan may be helpful.   Basilar pulmonary nodules, may represent granulomas versus metastases.  This can be further evaluated with follow-up CT in 3 to 6 months.  Sclerosis in the medial left iliac bone, may represent bone island versus sclerotic metastases.     CT Chest Without Contrast Diagnostic     Result Date: 10/17/2024  Impression: 1. Stable bilateral pulmonary nodules. Recommend 6-month follow-up until 2-year stability can be documented.      EGD and colonoscopy were completed by Dr. Rogel on 4/23/2021:  - The oropharynx was normal.  - The Z-line was regular and was found 36 cm from the incisors.  - No gross lesions were noted in the entire esophagus.  - Patchy mildly erythematous mucosa without bleeding was found on the posterior wall of the stomach and in the gastric antrum. Biopsies were taken with a cold forceps for Helicobacter pylori testing. Biopsies were taken with a cold forceps for Helicobacter pylori testing.  - The duodenal bulb, first portion of the duodenum, second portion of the duodenum and third portion of the duodenum were normal. Biopsies for histology were taken with a cold forceps for evaluation of celiac disease.  - The perianal and digital rectal examinations were normal.  - A 5 mm polyp was found in the proximal descending colon. The polyp was sessile. The polyp was removed with a cold snare. Resection and retrieval were complete.  - A 15 to 16 mm polyp was found in the rectum. The polyp was flat and Lyla classification IIb (flat). Preparations were made for mucosal resection. 5 mL of orise was injected with adequate lift of the lesion from the muscularis propria.  Snare mucosal resection with suction (via the working channel) retrieval was performed. A 15 x 20 mm area was resected. Resection and retrieval were complete. There was no bleeding during the procedure. To close a defect after polypectomy, three hemostatic clips were successfully placed (MR conditional). There was no bleeding at the end of the  procedure. Area was tattooed with an injection of 4 mL of Tanya ink.  - A few small-mouthed diverticula were found in the sigmoid colon and ascending colon.  - Biopsies for histology were taken with a cold forceps from the right colon, left colon and transverse colon for evaluation of microscopic colitis.  - The terminal ileum appeared normal. Biopsies were taken with a cold forceps for histology for diarrhea.   - Pathology showed unremarkable duodenal mucosa with preserved villous architecture, reactive gastropathy, negative H. pylori, negative for intestinal metaplasia of the antral biopsy, random colon biopsies were unremarkable, descending colon polyp was tubular adenoma negative for high-grade dysplasia, rectal polyp was tubular adenoma.     Colonoscopy dated 10/17/2022 per Dr. Rogel  - One 6 mm polyp in the distal sigmoid colon, removed with a cold snare. Resected and retrieved.  - A tattoo was seen in the distal rectum. The tattoo site appeared normal.  - Two small 3- 4 mm polypoid area of mucosa at prior EMR site edge was found in the distal rectum removed with a cold snare. Resected and retrieved. Clinically hyperplastic.  - Diverticulosis in the sigmoid colon.  - The examined portion of the ileum was normal.  A.   SIGMOID COLON POLYP:   Tubular adenoma; negative for high-grade dysplasia.   B.   RECTAL POLYP:   Hyperplastic polyp.     Colonoscopy dated 9/11/2024 per Dr. Rogel  - Stool in the entire examined colon.  - Diverticulosis in the sigmoid colon, in the descending colon and at the hepatic flexure.  - One 5 mm polyp in the distal transverse colon, removed with a cold snare. Resected and retrieved.  - Non-bleeding internal hemorrhoids.  - A tattoo was seen in the distal rectum. The tattoo site appeared normal.  - Post EMR Scar in the distal rectum.  - The examined portion of the ileum was normal. Biopsied.  - Biopsies were taken with a cold forceps for histology in the rectum, in the sigmoid  colon, in the descending colon, in the transverse colon, in the ascending colon and in the cecum.  A.     TERMINAL ILEUM BIOPSY:  Small bowel mucosa with no significant pathologic abnormality  B.     CECUM BIOPSY, AND ASCENDING:  Colonic mucosa with no significant pathologic abnormality  C.     TRANSVERSE COLON BIOPSY:  Colonic mucosa with no significant pathologic abnormality  D.     DESCENDING COLON BIOPSY:  Colonic mucosa with no significant pathologic abnormality  E.     TRANSVERSE COLON POLYP:  Tubular adenoma  No high grade dysplasia identified  F.     SIGMOID COLON BIOPSY:  Colonic mucosa with no significant pathologic abnormality  G.     RECTAL BIOPSY:  Colonic mucosa with no significant pathologic abnormality      Assessment / Plan      1. Functional gastrointestinal disorder  2. Irritable bowel syndrome with diarrhea  3. Pancreatic insufficiency  4. Diarrhea, unspecified type  5. Lower abdominal pain  Symptoms unchanged, diarrhea does improve if he misses a dose of his blood pressure medications.  He is not having any significant abdominal pain at this time.  Denies any nausea or vomiting.  Denies any GI bleeding.  He is not taking the Colestid as Imodium helps better.  He tried taking Creon in the past, but it did not help.  He has tried Xifaxan in the past, but it did not help. TSH normal.  Celiac panel negative.  Fecal calprotectin normal.  Pancreatic elastase decreased at 90. Colonoscopy and EGD 4/23/2021 with colon polyps removed, biopsies unremarkable. duodenal biopsies with preserved villous architecture, no evidence of celiac.  Repeat colonoscopy 10/17/2022 with polyp removed, otherwise unremarkable.  CTAP with contrast dated 3/22/2024 with moderate wall thickening of the distal rectum.  Follow-up colonoscopy dated 9/11/2024 with stool in the entire examined colon, no evidence of colitis or ileitis.  Tattoo in the distal rectum, site appeared normal.  Terminal ileum and random biopsies  unremarkable.  Suspect symptoms multifactorial, functional gastrointestinal disorder/IBS-D at baseline, may have lifestyle, overlapping pancreatic insufficiency and medications contributing.  Low FODMAP diet-avoid all dairy. Advised to avoid chewing tobacco.  Metamucil or fiber Gummies daily.  Bentyl and Imodium as needed.  Small bowel PillCam to rule out small bowel Crohn's - patient declines at this time.  He will call back if he wants to schedule.  Labs    - CBC (No Diff); Future  - Comprehensive Metabolic Panel; Future  - Alpha-Gal IgE Panel; Future  - IgA; Future  - Tissue Transglutaminase, IgA; Future  - Allergens (52) Food; Future    6. Gastroesophageal reflux disease without esophagitis  7. Calculus of gallbladder without cholecystitis without obstruction  He has been taking Nexium 20 mg daily and has been having increased reflux.  He would like to try something else.  Denies difficulty swallowing.  EGD dated 4/23/2021 unremarkable, no Hair's.  CTAP 3/22/2024 with gallstones noted, patient denies abdominal pain, nausea or vomiting.  Liver enzymes normal.  Antireflux measures-avoid chewing tobacco.  Pantoprazole 20 mg 1 p.o. daily.  Will monitor gallstones for now.    - pantoprazole (PROTONIX) 20 MG EC tablet; 1 po in the am 30 minutes before breakfast.  Dispense: 90 tablet; Refill: 3    8. Personal history of colon polyps, unspecified  9. Family history of colon cancer in father  Colonoscopy in 2021 with 15 to 16 mm rectal polyp removed, tubular adenoma without dysplasia. Follow up colonoscopy dated 10/17/2022 with 2 polyps removed, one tubular adenoma without dysplasia in sigmoid colon, one polyp removed from prior EMR site-hyperplastic.  Colonoscopy dated 9/11/2024 with stool in the entire examined colon, 1 polyp removed, tubular adenoma without dysplasia.  Post EMR scar in the distal rectum noted.  Site appeared normal.There is a family history of colon cancer in his father diagnosed around age  74.  Colonoscopy for high risk surveillance in 5 years, September 2029.    Patient Instructions   Antireflux measures: Avoid fried, fatty foods, alcohol, chocolate, coffee, tea,  soft drinks, peppermint and spearmint, spicy foods, tomatoes and tomato based foods, onions, peppers, and smoking.   Other antireflux measures include weight reduction if overweight, avoiding tight clothing around the abdomen, elevating the head of the bed 6 inches with blocks under the head board, and don't drink or eat before going to bed and avoid lying down immediately after meals.  Avoid vaping/smoking.  Pantoprazole 20 mg 1 by mouth in the am 30 minutes before breakfast.  Stop Nexium.  High fiber, low fat diet with liberal water intake.   Metamucil 1 packet/scoop daily or fiber gummies/fiber capsules 2-4 per day.    Bentyl 20 mg 1 po 3 times per day as needed for lower abdominal pain.  Imodium 2 mg 1/2-1 caplet 1-2 times per day as needed for diarrhea.   Low FODMAP diet - Avoid dairy. May use lactose free/dairy free alternatives such as almond milk, oat milk, etc.  Colonoscopy for surveillance in 5 years, September 2029.  Labs  Follow up: 6 months           Low-FODMAP Eating Plan  FODMAP stands for fermentable oligosaccharides, disaccharides, monosaccharides, and polyols. These are sugars that are hard for some people to digest. A low-FODMAP eating plan may help some people who have irritable bowel syndrome (IBS) and certain other bowel (intestinal) diseases to manage their symptoms.  This meal plan can be complicated to follow. Work with a diet and nutrition specialist (dietitian) to make a low-FODMAP eating plan that is right for you. A dietitian can help make sure that you get enough nutrition from this diet.  What are tips for following this plan?  Reading food labels  Check labels for hidden FODMAPs such as:  High-fructose syrup.  Honey.  Agave.  Natural fruit flavors.  Onion or garlic powder.  Choose low-FODMAP foods that  contain 3-4 grams of fiber per serving.  Check food labels for serving sizes. Eat only one serving at a time to make sure FODMAP levels stay low.  Shopping  Shop with a list of foods that are recommended on this diet and make a meal plan.  Meal planning  Follow a low-FODMAP eating plan for up to 6 weeks, or as told by your health care provider or dietitian.  To follow the eating plan:  Eliminate high-FODMAP foods from your diet completely. Choose only low-FODMAP foods to eat. You will do this for 2-6 weeks.  Gradually reintroduce high-FODMAP foods into your diet one at a time. Most people should wait a few days before introducing the next new high-FODMAP food into their meal plan. Your dietitian can recommend how quickly you may reintroduce foods.  Keep a daily record of what and how much you eat and drink. Make note of any symptoms that you have after eating.  Review your daily record with a dietitian regularly to identify which foods you can eat and which foods you should avoid.  General tips  Drink enough fluid each day to keep your urine pale yellow.  Avoid processed foods. These often have added sugar and may be high in FODMAPs.  Avoid most dairy products, whole grains, and sweeteners.  Work with a dietitian to make sure you get enough fiber in your diet.  Avoid high FODMAP foods at meals to manage symptoms.     Recommended foods  Fruits  Bananas, oranges, tangerines, kelly, limes, blueberries, raspberries, strawberries, grapes, cantaloupe, honeydew melon, kiwi, papaya, passion fruit, and pineapple. Limited amounts of dried cranberries, banana chips, and shredded coconut.  Vegetables  Eggplant, zucchini, cucumber, peppers, green beans, bean sprouts, lettuce, arugula, kale, Swiss chard, spinach, mahesh greens, bok estephania, summer squash, potato, and tomato. Limited amounts of corn, carrot, and sweet potato. Green parts of scallions.  Grains  Gluten-free grains, such as rice, oats, buckwheat, quinoa, corn,  "polenta, and millet. Gluten-free pasta, bread, or cereal. Rice noodles. Corn tortillas.  Meats and other proteins  Unseasoned beef, pork, poultry, or fish. Eggs. Springer. Tofu (firm) and tempeh. Limited amounts of nuts and seeds, such as almonds, walnuts, brazil nuts, pecans, peanuts, nut butters, pumpkin seeds, efren seeds, and sunflower seeds.  Dairy  Lactose-free milk, yogurt, and kefir. Lactose-free cottage cheese and ice cream. Non-dairy milks, such as almond, coconut, hemp, and rice milk. Non-dairy yogurt. Limited amounts of goat cheese, brie, mozzarella, parmesan, swiss, and other hard cheeses.  Fats and oils  Butter-free spreads. Vegetable oils, such as olive, canola, and sunflower oil.  Seasoning and other foods  Artificial sweeteners with names that do not end in \"ol,\" such as aspartame, saccharine, and stevia. Maple syrup, white table sugar, raw sugar, brown sugar, and molasses. Mayonnaise, soy sauce, and tamari. Fresh basil, coriander, parsley, rosemary, and thyme.  Beverages  Water and mineral water. Sugar-sweetened soft drinks. Small amounts of orange juice or cranberry juice. Black and green tea. Most dry arlene. Coffee.  The items listed above may not be a complete list of foods and beverages you can eat. Contact a dietitian for more information.     Foods to avoid  Fruits  Fresh, dried, and juiced forms of apple, pear, watermelon, peach, plum, cherries, apricots, blackberries, boysenberries, figs, nectarines, and liliana. Avocado.  Vegetables  Chicory root, artichoke, asparagus, cabbage, snow peas, [place] sprouts, broccoli, sugar snap peas, mushrooms, celery, and cauliflower. Onions, garlic, leeks, and the white part of scallions.  Grains  Wheat, including kamut, durum, and semolina. Barley and bulgur. Couscous. Wheat-based cereals. Wheat noodles, bread, crackers, and pastries.  Meats and other proteins  Fried or fatty meat. Sausage. Cashews and pistachios. Soybeans, baked beans, black beans, chickpeas, " kidney beans, anthony beans, navy beans, lentils, black-eyed peas, and split peas.  Dairy  Milk, yogurt, ice cream, and soft cheese. Cream and sour cream. Milk-based sauces. Custard. Buttermilk. Soy milk.  Seasoning and other foods  Any sugar-free gum or candy. Foods that contain artificial sweeteners such as sorbitol, mannitol, isomalt, or xylitol. Foods that contain honey, high-fructose corn syrup, or agave. Bouillon, vegetable stock, beef stock, and chicken stock. Garlic and onion powder. Condiments made with onion, such as hummus, chutney, pickles, relish, salad dressing, and salsa. Tomato paste.  Beverages  Chicory-based drinks. Coffee substitutes. Chamomile tea. Fennel tea. Sweet or fortified arlene such as port or vito. Diet soft drinks made with isomalt, mannitol, maltitol, sorbitol, or xylitol. Apple, pear, and liliana juice. Juices with high-fructose corn syrup.  The items listed above may not be a complete list of foods and beverages you should avoid. Contact a dietitian for more information.  Summary  FODMAP stands for fermentable oligosaccharides, disaccharides, monosaccharides, and polyols. These are sugars that are hard for some people to digest.  A low-FODMAP eating plan is a short-term diet that helps to ease symptoms of certain bowel diseases.  The eating plan usually lasts up to 6 weeks. After that, high-FODMAP foods are reintroduced gradually and one at a time. This can help you find out which foods may be causing symptoms.  A low-FODMAP eating plan can be complicated. It is best to work with a dietitian who has experience with this type of plan.  This information is not intended to replace advice given to you by your health care provider. Make sure you discuss any questions you have with your health care provider.  Document Revised: 05/06/2021 Document Reviewed: 05/06/2021  Elsevier Patient Education © 2022 [employer]     Trinity Rogers, KENAN  7/9/2025    Please note that portions of this note  were completed with a voice recognition program.     Patient or patient representative verbalized consent for the use of Ambient Listening during the visit with  KENAN Medrano for chart documentation. 7/9/2025  16:33 EDT

## 2025-07-10 LAB
ALBUMIN SERPL-MCNC: 4.1 G/DL (ref 3.5–5.2)
ALBUMIN/GLOB SERPL: 1.4 G/DL
ALP SERPL-CCNC: 77 U/L (ref 39–117)
ALT SERPL W P-5'-P-CCNC: 17 U/L (ref 1–41)
ANION GAP SERPL CALCULATED.3IONS-SCNC: 15 MMOL/L (ref 5–15)
AST SERPL-CCNC: 21 U/L (ref 1–40)
BILIRUB SERPL-MCNC: 0.2 MG/DL (ref 0–1.2)
BUN SERPL-MCNC: 16 MG/DL (ref 6–20)
BUN/CREAT SERPL: 14 (ref 7–25)
CALCIUM SPEC-SCNC: 9 MG/DL (ref 8.6–10.5)
CHLORIDE SERPL-SCNC: 104 MMOL/L (ref 98–107)
CO2 SERPL-SCNC: 18 MMOL/L (ref 22–29)
CREAT SERPL-MCNC: 1.14 MG/DL (ref 0.76–1.27)
EGFRCR SERPLBLD CKD-EPI 2021: 74.5 ML/MIN/1.73
GLOBULIN UR ELPH-MCNC: 2.9 GM/DL
GLUCOSE SERPL-MCNC: 180 MG/DL (ref 65–99)
IGA1 MFR SER: 260 MG/DL (ref 70–400)
POTASSIUM SERPL-SCNC: 3.8 MMOL/L (ref 3.5–5.2)
PROT SERPL-MCNC: 7 G/DL (ref 6–8.5)
SODIUM SERPL-SCNC: 137 MMOL/L (ref 136–145)

## 2025-07-11 LAB — TTG IGA SER-ACNC: <2 U/ML (ref 0–3)

## 2025-07-15 LAB
ALPHA-GAL IGE QN: 0.38 KU/L
BEEF IGE QN: <0.1 KU/L
IGE SERPL-ACNC: 15 IU/ML (ref 6–495)
LAMB IGE QN: <0.1 KU/L
PORK IGE QN: <0.1 KU/L
SERVICE CMNT-IMP: ABNORMAL

## 2025-07-16 LAB
A-LACTALB IGE QN: <0.1 KU/L
ALMOND IGE QN: <0.1 KU/L
APPLE IGE QN: <0.1 KU/L
AVOCADO IGE QN: <0.1 KU/L
BAKER'S YEAST IGE QN: <0.1 KU/L
BANANA IGE QN: <0.1 KU/L
BEEF IGE QN: <0.1 KU/L
BLACK PEPPER IGE QN: <0.1 KU/L
BROCCOLI IGE QN: <0.1 KU/L
CABBAGE IGE QN: <0.1 KU/L
CARROT IGE QN: <0.1 KU/L
CASHEW NUT IGE QN: <0.1 KU/L
CHEESE MOLD IGE QN: <0.1 KU/L
CHICKEN MEAT IGE QN: <0.1 KU/L
COCOA IGE QN: <0.1 KU/L
COFFEE IGE QN: <0.1 KU/L
CORN IGE QN: <0.1 KU/L
COW MILK IGE QN: <0.1 KU/L
CRAB IGE QN: <0.1 KU/L
EGG WHITE IGE QN: <0.1 KU/L
EGG YOLK IGE QN: <0.1 KU/L
GARLIC IGE QN: <0.1 KU/L
GRAPE IGE QN: <0.1 KU/L
GREEN BEAN IGE QN: <0.1 KU/L
HADDOCK IGE QN: <0.1 KU/L
HALIBUT IGE QN: <0.1 KU/L
HAZELNUT IGE QN: <0.1 KU/L
LAMB IGE QN: <0.1 KU/L
LEMON IGE QN: <0.1 KU/L
MELON IGE QN: <0.1 KU/L
MUSHROOM IGE QN: <0.1 KU/L
OAT IGE QN: <0.1 KU/L
ONION IGE QN: <0.1 KU/L
ORANGE IGE QN: <0.1 KU/L
OYSTER IGE QN: <0.1 KU/L
PAPRIKA IGE QN: <0.1 KU/L
PEANUT IGE QN: <0.1 KU/L
PORK IGE QN: <0.1 KU/L
POTATO IGE QN: <0.1 KU/L
RICE IGE QN: <0.1 KU/L
RYE IGE QN: <0.1 KU/L
SALMON IGE QN: <0.1 KU/L
SERVICE CMNT-IMP: NORMAL
SHRIMP IGE QN: <0.1 KU/L
SOYBEAN IGE QN: <0.1 KU/L
STRAWBERRY IGE QN: <0.1 KU/L
TEA IGE QN: <0.1 KU/L
TOMATO IGE QN: <0.1 KU/L
TUNA IGE QN: <0.1 KU/L
TURKEY MEAT IGE QN: <0.1 KU/L
VANILLA IGE QN: <0.1 KU/L
WHEAT IGE QN: <0.1 KU/L
WHOLE EGG IGE QN: <0.1 KU/L

## 2025-07-22 ENCOUNTER — TELEPHONE (OUTPATIENT)
Dept: GASTROENTEROLOGY | Facility: CLINIC | Age: 60
End: 2025-07-22
Payer: COMMERCIAL

## 2025-07-22 NOTE — TELEPHONE ENCOUNTER
----- Message from Trinity Rogers sent at 7/21/2025  9:32 PM EDT -----  The alpha gal is mildly elevated, but negative for pork, beef and lamb. He has likely had alpha gal and is at the end and is likely clearing it or he has just recently become exposed. He needs to try to avoid all pork, beef and lamb meat and product and see if his symptoms improve. This includes all milk and dairy products (such as cheese, yogurt, ice cream) and products that contain meat byproducts such as gelatin, marshmallows, etc. There is no cure or treatment to eliminate alpha gal, other than avoiding all mammalian products.  ----- Message -----  From: Jenniffer Adan MA  Sent: 7/21/2025  10:14 AM EDT  To: KENAN Ngo    He wants to know specifically about the alpha gal lab, told him about his CBC, CMP being okay.

## 2025-08-18 ENCOUNTER — OFFICE VISIT (OUTPATIENT)
Dept: PULMONOLOGY | Facility: CLINIC | Age: 60
End: 2025-08-18
Payer: COMMERCIAL

## 2025-08-18 VITALS
OXYGEN SATURATION: 97 % | DIASTOLIC BLOOD PRESSURE: 62 MMHG | HEIGHT: 64 IN | BODY MASS INDEX: 32.78 KG/M2 | WEIGHT: 192 LBS | HEART RATE: 69 BPM | SYSTOLIC BLOOD PRESSURE: 140 MMHG

## 2025-08-18 DIAGNOSIS — R93.89 ABNORMAL CT OF THE CHEST: ICD-10-CM

## 2025-08-18 DIAGNOSIS — R91.8 LUNG NODULE, MULTIPLE: Primary | ICD-10-CM

## 2025-08-18 PROCEDURE — 99213 OFFICE O/P EST LOW 20 MIN: CPT | Performed by: NURSE PRACTITIONER

## (undated) DEVICE — FRCP BIOP COLD ENDOJAW ALLGTR W/NDL 2.8X2300MM BLU

## (undated) DEVICE — SINGLE-USE POLYPECTOMY SNARE: Brand: CAPTIVATOR II

## (undated) DEVICE — Device

## (undated) DEVICE — ENDOSCOPY PORT CONNECTOR FOR OLYMPUS® SCOPES: Brand: ERBE

## (undated) DEVICE — NDL SCLEROTHERAPY INTERJECT 25G 4 240CM

## (undated) DEVICE — HYBRID CO2 TUBING/CAP SET FOR OLYMPUS® SCOPES & CO2 SOURCE: Brand: ERBE

## (undated) DEVICE — LUBE JELLY PK/2.75GM STRL BX/144

## (undated) DEVICE — SUCTION CANISTER, 1500CC, RIGID: Brand: DEROYAL

## (undated) DEVICE — HYBRID TUBING/CAP SET FOR OLYMPUS® SCOPES: Brand: ERBE

## (undated) DEVICE — CONMED SCOPE SAVER BITE BLOCK, 20X27 MM: Brand: SCOPE SAVER

## (undated) DEVICE — VLV SXN AIR/H2O ORCAPOD3 1P/U STRL

## (undated) DEVICE — QUICK CATCH IN-LINE SUCTION POLYP TRAP IS USED FOR SUCTION RETRIEVAL OF ENDOSCOPICALLY REMOVED POLYPS.

## (undated) DEVICE — PAD GRND REM POLYHESIVE A/ DISP

## (undated) DEVICE — FRCP BX RADJAW4 NDL 2.8 240 STD OG

## (undated) DEVICE — Device: Brand: SPOT EX ENDOSCOPIC TATTOO